# Patient Record
Sex: FEMALE | Race: WHITE | NOT HISPANIC OR LATINO
[De-identification: names, ages, dates, MRNs, and addresses within clinical notes are randomized per-mention and may not be internally consistent; named-entity substitution may affect disease eponyms.]

---

## 2017-02-03 ENCOUNTER — OTHER (OUTPATIENT)
Age: 67
End: 2017-02-03

## 2017-02-03 ENCOUNTER — RESULT CHARGE (OUTPATIENT)
Age: 67
End: 2017-02-03

## 2017-02-03 ENCOUNTER — APPOINTMENT (OUTPATIENT)
Dept: INTERNAL MEDICINE | Facility: CLINIC | Age: 67
End: 2017-02-03

## 2017-02-03 VITALS
BODY MASS INDEX: 19.46 KG/M2 | TEMPERATURE: 97 F | HEIGHT: 67 IN | WEIGHT: 124 LBS | DIASTOLIC BLOOD PRESSURE: 64 MMHG | SYSTOLIC BLOOD PRESSURE: 110 MMHG

## 2017-02-03 DIAGNOSIS — K21.9 GASTRO-ESOPHAGEAL REFLUX DISEASE W/OUT ESOPHAGITIS: ICD-10-CM

## 2017-03-30 PROBLEM — Z80.0 FAMILY HISTORY OF COLON CANCER: Status: ACTIVE | Noted: 2017-03-30

## 2017-03-30 PROBLEM — Z80.0 FAMILY HISTORY OF PANCREATIC CANCER: Status: ACTIVE | Noted: 2017-03-30

## 2017-07-26 ENCOUNTER — RX RENEWAL (OUTPATIENT)
Age: 67
End: 2017-07-26

## 2017-11-15 ENCOUNTER — APPOINTMENT (OUTPATIENT)
Dept: SURGERY | Facility: CLINIC | Age: 67
End: 2017-11-15
Payer: MEDICARE

## 2017-11-15 PROCEDURE — 99205K: CUSTOM

## 2017-11-27 ENCOUNTER — OUTPATIENT (OUTPATIENT)
Dept: OUTPATIENT SERVICES | Facility: HOSPITAL | Age: 67
LOS: 1 days | End: 2017-11-27
Payer: MEDICARE

## 2017-11-27 ENCOUNTER — APPOINTMENT (OUTPATIENT)
Dept: ULTRASOUND IMAGING | Facility: IMAGING CENTER | Age: 67
End: 2017-11-27
Payer: MEDICARE

## 2017-11-27 VITALS
SYSTOLIC BLOOD PRESSURE: 100 MMHG | WEIGHT: 121.92 LBS | TEMPERATURE: 97 F | HEIGHT: 66 IN | HEART RATE: 60 BPM | DIASTOLIC BLOOD PRESSURE: 60 MMHG | RESPIRATION RATE: 16 BRPM

## 2017-11-27 DIAGNOSIS — R92.8 OTHER ABNORMAL AND INCONCLUSIVE FINDINGS ON DIAGNOSTIC IMAGING OF BREAST: ICD-10-CM

## 2017-11-27 DIAGNOSIS — Z00.00 ENCOUNTER FOR GENERAL ADULT MEDICAL EXAMINATION WITHOUT ABNORMAL FINDINGS: ICD-10-CM

## 2017-11-27 DIAGNOSIS — Z98.890 OTHER SPECIFIED POSTPROCEDURAL STATES: Chronic | ICD-10-CM

## 2017-11-27 DIAGNOSIS — K21.9 GASTRO-ESOPHAGEAL REFLUX DISEASE WITHOUT ESOPHAGITIS: ICD-10-CM

## 2017-11-27 LAB
ALBUMIN SERPL ELPH-MCNC: 4.2 G/DL — SIGNIFICANT CHANGE UP (ref 3.3–5)
ALP SERPL-CCNC: 57 U/L — SIGNIFICANT CHANGE UP (ref 40–120)
ALT FLD-CCNC: 27 U/L — SIGNIFICANT CHANGE UP (ref 4–33)
AST SERPL-CCNC: 25 U/L — SIGNIFICANT CHANGE UP (ref 4–32)
BILIRUB SERPL-MCNC: 0.3 MG/DL — SIGNIFICANT CHANGE UP (ref 0.2–1.2)
BUN SERPL-MCNC: 17 MG/DL — SIGNIFICANT CHANGE UP (ref 7–23)
CALCIUM SERPL-MCNC: 9 MG/DL — SIGNIFICANT CHANGE UP (ref 8.4–10.5)
CHLORIDE SERPL-SCNC: 101 MMOL/L — SIGNIFICANT CHANGE UP (ref 98–107)
CO2 SERPL-SCNC: 30 MMOL/L — SIGNIFICANT CHANGE UP (ref 22–31)
CREAT SERPL-MCNC: 0.77 MG/DL — SIGNIFICANT CHANGE UP (ref 0.5–1.3)
GLUCOSE SERPL-MCNC: 81 MG/DL — SIGNIFICANT CHANGE UP (ref 70–99)
HCT VFR BLD CALC: 40.7 % — SIGNIFICANT CHANGE UP (ref 34.5–45)
HGB BLD-MCNC: 13.2 G/DL — SIGNIFICANT CHANGE UP (ref 11.5–15.5)
MCHC RBC-ENTMCNC: 28.9 PG — SIGNIFICANT CHANGE UP (ref 27–34)
MCHC RBC-ENTMCNC: 32.4 % — SIGNIFICANT CHANGE UP (ref 32–36)
MCV RBC AUTO: 89.1 FL — SIGNIFICANT CHANGE UP (ref 80–100)
NRBC # FLD: 0 — SIGNIFICANT CHANGE UP
PLATELET # BLD AUTO: 198 K/UL — SIGNIFICANT CHANGE UP (ref 150–400)
PMV BLD: 10.8 FL — SIGNIFICANT CHANGE UP (ref 7–13)
POTASSIUM SERPL-MCNC: 3.9 MMOL/L — SIGNIFICANT CHANGE UP (ref 3.5–5.3)
POTASSIUM SERPL-SCNC: 3.9 MMOL/L — SIGNIFICANT CHANGE UP (ref 3.5–5.3)
PROT SERPL-MCNC: 6.3 G/DL — SIGNIFICANT CHANGE UP (ref 6–8.3)
RBC # BLD: 4.57 M/UL — SIGNIFICANT CHANGE UP (ref 3.8–5.2)
RBC # FLD: 11.9 % — SIGNIFICANT CHANGE UP (ref 10.3–14.5)
SODIUM SERPL-SCNC: 141 MMOL/L — SIGNIFICANT CHANGE UP (ref 135–145)
WBC # BLD: 3.75 K/UL — LOW (ref 3.8–10.5)
WBC # FLD AUTO: 3.75 K/UL — LOW (ref 3.8–10.5)

## 2017-11-27 PROCEDURE — 19285 PERQ DEV BREAST 1ST US IMAG: CPT

## 2017-11-27 PROCEDURE — 19285 PERQ DEV BREAST 1ST US IMAG: CPT | Mod: LT

## 2017-11-27 PROCEDURE — 93010 ELECTROCARDIOGRAM REPORT: CPT

## 2017-11-27 PROCEDURE — C1739: CPT

## 2017-11-27 PROCEDURE — 71020: CPT | Mod: 26

## 2017-11-27 RX ORDER — ESTRADIOL 4 UG/1
1 INSERT VAGINAL
Qty: 0 | Refills: 0 | COMMUNITY

## 2017-11-27 RX ORDER — SODIUM CHLORIDE 9 MG/ML
1000 INJECTION, SOLUTION INTRAVENOUS
Qty: 0 | Refills: 0 | Status: DISCONTINUED | OUTPATIENT
Start: 2017-11-28 | End: 2017-12-13

## 2017-11-27 NOTE — H&P PST ADULT - PROBLEM SELECTOR PLAN 1
Pt is scheduled left breast biopsy with seed localization on 11/28/17.   Pre op instructions including chlorhexidine wash given and pt able to verbalize understanding.

## 2017-11-27 NOTE — H&P PST ADULT - HISTORY OF PRESENT ILLNESS
68 yo female with PMH GERD presents to pre surgical testing.  Pt reports she had a routine mammogram and sonogram October 2017, abnormality found in left breast.  Pt reports left breast biopsy done Oct 2017, revealed abnormality.  Pt is scheduled left breast biopsy with seed localization on 11/28/17.

## 2017-11-27 NOTE — H&P PST ADULT - NEGATIVE MUSCULOSKELETAL SYMPTOMS
no arthralgia/no joint swelling/no muscle weakness/no stiffness/no neck pain/no back pain/no leg pain R/no leg pain L/no myalgia/no muscle cramps/no arm pain L/no arm pain R

## 2017-11-27 NOTE — H&P PST ADULT - NSANTHOSAYNRD_GEN_A_CORE
No. GRAY screening performed.  STOP BANG Legend: 0-2 = LOW Risk; 3-4 = INTERMEDIATE Risk; 5-8 = HIGH Risk

## 2017-11-27 NOTE — H&P PST ADULT - NEGATIVE CARDIOVASCULAR SYMPTOMS
no orthopnea/no paroxysmal nocturnal dyspnea/no chest pain/no peripheral edema/no dyspnea on exertion/no palpitations/no claudication

## 2017-11-27 NOTE — H&P PST ADULT - FAMILY HISTORY
Father  Still living? No  Family history of ischemic heart disease, Age at diagnosis: Age Unknown     Mother  Still living? Yes, Estimated age: Age Unknown  Family history of colon cancer, Age at diagnosis: Age Unknown  Family history of breast cancer, Age at diagnosis: Age Unknown

## 2017-11-27 NOTE — H&P PST ADULT - LYMPHATIC
posterior cervical L/supraclavicular L/anterior cervical L/supraclavicular R/anterior cervical R/posterior cervical R

## 2017-11-27 NOTE — H&P PST ADULT - RS GEN PE MLT RESP DETAILS PC
no wheezes/good air movement/no intercostal retractions/no rhonchi/no chest wall tenderness/airway patent/no subcutaneous emphysema/clear to auscultation bilaterally/breath sounds equal/respirations non-labored/no rales

## 2017-11-27 NOTE — H&P PST ADULT - NEGATIVE NEUROLOGICAL SYMPTOMS
no paresthesias/no transient paralysis/no weakness/no generalized seizures/no loss of sensation/no difficulty walking/no vertigo

## 2017-11-27 NOTE — H&P PST ADULT - GASTROINTESTINAL COMMENTS
"had stomach virus 11/21, lasted <24hrs, no symptoms since 11/22.  Called PCP on 11/21 and PCP said I did not need to be seen" "had stomach virus 11/21, lasted <24hrs, Called PCP on 11/21 and PCP said I did not need to be seen. No symptoms since 11/22."

## 2017-11-27 NOTE — H&P PST ADULT - PMH
GERD (gastroesophageal reflux disease)    Other abnormal and inconclusive findings on diagnostic imaging of breast Dry eyes    GERD (gastroesophageal reflux disease)    Other abnormal and inconclusive findings on diagnostic imaging of breast

## 2017-11-28 ENCOUNTER — APPOINTMENT (OUTPATIENT)
Dept: SURGERY | Facility: HOSPITAL | Age: 67
End: 2017-11-28

## 2017-11-28 ENCOUNTER — RESULT REVIEW (OUTPATIENT)
Age: 67
End: 2017-11-28

## 2017-11-28 ENCOUNTER — OUTPATIENT (OUTPATIENT)
Dept: OUTPATIENT SERVICES | Facility: HOSPITAL | Age: 67
LOS: 1 days | Discharge: HOME | End: 2017-11-28
Payer: MEDICARE

## 2017-11-28 VITALS
TEMPERATURE: 99 F | WEIGHT: 121.92 LBS | HEART RATE: 72 BPM | RESPIRATION RATE: 15 BRPM | SYSTOLIC BLOOD PRESSURE: 107 MMHG | OXYGEN SATURATION: 98 % | HEIGHT: 66 IN | DIASTOLIC BLOOD PRESSURE: 51 MMHG

## 2017-11-28 VITALS
DIASTOLIC BLOOD PRESSURE: 46 MMHG | SYSTOLIC BLOOD PRESSURE: 108 MMHG | RESPIRATION RATE: 16 BRPM | OXYGEN SATURATION: 97 % | HEART RATE: 73 BPM

## 2017-11-28 DIAGNOSIS — Z98.890 OTHER SPECIFIED POSTPROCEDURAL STATES: Chronic | ICD-10-CM

## 2017-11-28 DIAGNOSIS — R92.8 OTHER ABNORMAL AND INCONCLUSIVE FINDINGS ON DIAGNOSTIC IMAGING OF BREAST: ICD-10-CM

## 2017-11-28 PROCEDURE — 88360 TUMOR IMMUNOHISTOCHEM/MANUAL: CPT | Mod: 26

## 2017-11-28 PROCEDURE — 76098 X-RAY EXAM SURGICAL SPECIMEN: CPT | Mod: 26,GC

## 2017-11-28 PROCEDURE — 19125K: CUSTOM | Mod: LT

## 2017-11-28 PROCEDURE — 88307 TISSUE EXAM BY PATHOLOGIST: CPT | Mod: 26

## 2017-11-28 NOTE — ASU PATIENT PROFILE, ADULT - PMH
Dry eyes    GERD (gastroesophageal reflux disease)    Other abnormal and inconclusive findings on diagnostic imaging of breast

## 2017-11-28 NOTE — ASU DISCHARGE PLAN (ADULT/PEDIATRIC). - MEDICATION SUMMARY - MEDICATIONS TO TAKE
I will START or STAY ON the medications listed below when I get home from the hospital:    Refresh Liquigel ophthalmic solution  -- 1 drop(s) to each affected eye once a day  -- Indication: For Home med    pantoprazole 40 mg oral delayed release tablet  -- 1 tab(s) by mouth once a day  -- Indication: For GERD    Yuvafem 10 mcg vaginal tablet  -- Mon and Thus  -- Indication: For Home med

## 2017-11-28 NOTE — ASU DISCHARGE PLAN (ADULT/PEDIATRIC). - NOTIFY
Fever greater than 101/Bleeding that does not stop/Swelling that continues Swelling that continues/Increased Irritability or Sluggishness/Pain not relieved by Medications/Fever greater than 101/Persistent Nausea and Vomiting/Bleeding that does not stop

## 2017-11-30 ENCOUNTER — APPOINTMENT (OUTPATIENT)
Dept: MRI IMAGING | Facility: IMAGING CENTER | Age: 67
End: 2017-11-30
Payer: MEDICARE

## 2017-11-30 ENCOUNTER — OUTPATIENT (OUTPATIENT)
Dept: OUTPATIENT SERVICES | Facility: HOSPITAL | Age: 67
LOS: 1 days | End: 2017-11-30
Payer: MEDICARE

## 2017-11-30 DIAGNOSIS — Z00.8 ENCOUNTER FOR OTHER GENERAL EXAMINATION: ICD-10-CM

## 2017-11-30 DIAGNOSIS — Z98.890 OTHER SPECIFIED POSTPROCEDURAL STATES: Chronic | ICD-10-CM

## 2017-11-30 LAB — SURGICAL PATHOLOGY STUDY: SIGNIFICANT CHANGE UP

## 2017-11-30 PROCEDURE — C8937: CPT

## 2017-11-30 PROCEDURE — 77059 MRI BREAST BILATERAL: CPT | Mod: 26

## 2017-11-30 PROCEDURE — 0159T: CPT | Mod: 26

## 2017-11-30 PROCEDURE — 82565 ASSAY OF CREATININE: CPT

## 2017-11-30 PROCEDURE — C8908: CPT

## 2017-11-30 PROCEDURE — A9585: CPT

## 2017-12-05 ENCOUNTER — OUTPATIENT (OUTPATIENT)
Dept: OUTPATIENT SERVICES | Facility: HOSPITAL | Age: 67
LOS: 1 days | Discharge: HOME | End: 2017-12-05
Payer: MEDICARE

## 2017-12-05 ENCOUNTER — APPOINTMENT (OUTPATIENT)
Dept: NUCLEAR MEDICINE | Facility: HOSPITAL | Age: 67
End: 2017-12-05

## 2017-12-05 ENCOUNTER — RESULT REVIEW (OUTPATIENT)
Age: 67
End: 2017-12-05

## 2017-12-05 ENCOUNTER — APPOINTMENT (OUTPATIENT)
Dept: SURGERY | Facility: HOSPITAL | Age: 67
End: 2017-12-05

## 2017-12-05 VITALS
HEIGHT: 66 IN | TEMPERATURE: 98 F | OXYGEN SATURATION: 100 % | WEIGHT: 121.92 LBS | HEART RATE: 63 BPM | RESPIRATION RATE: 16 BRPM | SYSTOLIC BLOOD PRESSURE: 102 MMHG | DIASTOLIC BLOOD PRESSURE: 50 MMHG

## 2017-12-05 VITALS
TEMPERATURE: 98 F | OXYGEN SATURATION: 99 % | HEART RATE: 59 BPM | RESPIRATION RATE: 16 BRPM | SYSTOLIC BLOOD PRESSURE: 100 MMHG | DIASTOLIC BLOOD PRESSURE: 56 MMHG

## 2017-12-05 DIAGNOSIS — Z98.890 OTHER SPECIFIED POSTPROCEDURAL STATES: Chronic | ICD-10-CM

## 2017-12-05 DIAGNOSIS — C50.912 MALIGNANT NEOPLASM OF UNSPECIFIED SITE OF LEFT FEMALE BREAST: ICD-10-CM

## 2017-12-05 PROCEDURE — 38792K: CUSTOM | Mod: 58,LT

## 2017-12-05 PROCEDURE — 38525K: CUSTOM | Mod: 58,LT

## 2017-12-05 PROCEDURE — 88305 TISSUE EXAM BY PATHOLOGIST: CPT | Mod: 26

## 2017-12-05 PROCEDURE — 19301K: CUSTOM | Mod: 58,LT

## 2017-12-05 RX ORDER — ESTRADIOL 4 UG/1
1 INSERT VAGINAL
Qty: 0 | Refills: 0 | COMMUNITY

## 2017-12-05 RX ORDER — ACETAMINOPHEN 500 MG
1 TABLET ORAL
Qty: 8 | Refills: 0 | OUTPATIENT
Start: 2017-12-05

## 2017-12-05 RX ORDER — PANTOPRAZOLE SODIUM 20 MG/1
1 TABLET, DELAYED RELEASE ORAL
Qty: 0 | Refills: 0 | COMMUNITY

## 2017-12-05 RX ORDER — SODIUM CHLORIDE 9 MG/ML
1000 INJECTION, SOLUTION INTRAVENOUS
Qty: 0 | Refills: 0 | Status: DISCONTINUED | OUTPATIENT
Start: 2017-12-05 | End: 2017-12-20

## 2017-12-05 NOTE — BRIEF OPERATIVE NOTE - PROCEDURE
<<-----Click on this checkbox to enter Procedure [place] lymph node biopsy  12/05/2017    Active  RHETT  Breast biopsy, left  12/05/2017    Active  MARIE

## 2017-12-05 NOTE — ASU DISCHARGE PLAN (ADULT/PEDIATRIC). - SPECIAL INSTRUCTIONS
Please call the office if you are having brisk bleeding through several new bandages, a warm or red breast with or without pus or a fever greater than 101.    Small amounts of blood may escape from under the steri strips. They will fall off on their own.    Take tylenol or ibuprofen as needed for pain.    Bruising is normal and may become more pronounced in the days following surgery.    Please call the office and make an appointment for about one week from the date of surgery. You may dial [phone] to make an appointment.

## 2017-12-05 NOTE — ASU DISCHARGE PLAN (ADULT/PEDIATRIC). - NOTIFY
Numbness, color, or temperature change to extremity/Fever greater than 101/Swelling that continues/Bleeding that does not stop Fever greater than 101/Numbness, color, or temperature change to extremity/Persistent Nausea and Vomiting/Unable to Urinate/Pain not relieved by Medications/Bleeding that does not stop/Swelling that continues

## 2017-12-05 NOTE — ASU DISCHARGE PLAN (ADULT/PEDIATRIC). - MEDICATION SUMMARY - MEDICATIONS TO TAKE
I will START or STAY ON the medications listed below when I get home from the hospital:    Refresh Liquigel ophthalmic solution  -- 1 drop(s) to each affected eye once a day  -- Indication: For Home med    pantoprazole 40 mg oral delayed release tablet  -- 1 tab(s) by mouth once a day  -- Indication: For Home med    Yuvafem 10 mcg vaginal tablet  -- Mon and Thus  -- Indication: For Home med

## 2017-12-07 LAB — SURGICAL PATHOLOGY STUDY: SIGNIFICANT CHANGE UP

## 2017-12-11 ENCOUNTER — APPOINTMENT (OUTPATIENT)
Dept: SURGERY | Facility: CLINIC | Age: 67
End: 2017-12-11
Payer: MEDICARE

## 2017-12-11 PROCEDURE — 99024 POSTOP FOLLOW-UP VISIT: CPT

## 2017-12-11 NOTE — PACU DISCHARGE NOTE - HYDRATION STATUS:
LOV: 5--pt to fu 6 months-please call to schedule  No future follow up scheduled    Last refill:     Disp Refills Start End    Propranolol HCl  MG CAPSULE SR 24 HR 30 capsule 5 5/11/2017     Sig: Sig 1 tab po daily      Propranolol HCl  MG CAPSULE SR 24 HR  Sig 1 tab po daily   Eprescribe, Disp-30 capsule, R-1   Patient needs to schedule appt to ensure future refills Satisfactory

## 2017-12-15 ENCOUNTER — RESULT REVIEW (OUTPATIENT)
Age: 67
End: 2017-12-15

## 2017-12-20 ENCOUNTER — OUTPATIENT (OUTPATIENT)
Dept: OUTPATIENT SERVICES | Facility: HOSPITAL | Age: 67
LOS: 1 days | Discharge: HOME | End: 2017-12-20

## 2017-12-20 DIAGNOSIS — Z98.890 OTHER SPECIFIED POSTPROCEDURAL STATES: Chronic | ICD-10-CM

## 2017-12-22 ENCOUNTER — OUTPATIENT (OUTPATIENT)
Dept: OUTPATIENT SERVICES | Facility: HOSPITAL | Age: 67
LOS: 1 days | Discharge: HOME | End: 2017-12-22

## 2017-12-22 DIAGNOSIS — Z98.890 OTHER SPECIFIED POSTPROCEDURAL STATES: Chronic | ICD-10-CM

## 2017-12-22 DIAGNOSIS — D72.819 DECREASED WHITE BLOOD CELL COUNT, UNSPECIFIED: ICD-10-CM

## 2018-01-03 ENCOUNTER — APPOINTMENT (OUTPATIENT)
Dept: HEMATOLOGY ONCOLOGY | Facility: CLINIC | Age: 68
End: 2018-01-03
Payer: MEDICARE

## 2018-01-03 ENCOUNTER — APPOINTMENT (OUTPATIENT)
Dept: RADIATION ONCOLOGY | Facility: CLINIC | Age: 68
End: 2018-01-03
Payer: MEDICARE

## 2018-01-03 VITALS
RESPIRATION RATE: 16 BRPM | DIASTOLIC BLOOD PRESSURE: 70 MMHG | HEIGHT: 66 IN | WEIGHT: 121 LBS | SYSTOLIC BLOOD PRESSURE: 105 MMHG | OXYGEN SATURATION: 100 % | HEART RATE: 81 BPM | BODY MASS INDEX: 19.44 KG/M2 | TEMPERATURE: 98.1 F

## 2018-01-03 VITALS
BODY MASS INDEX: 19.33 KG/M2 | SYSTOLIC BLOOD PRESSURE: 111 MMHG | HEIGHT: 66.73 IN | WEIGHT: 121.7 LBS | TEMPERATURE: 98.4 F | RESPIRATION RATE: 16 BRPM | DIASTOLIC BLOOD PRESSURE: 68 MMHG | HEART RATE: 82 BPM | OXYGEN SATURATION: 100 %

## 2018-01-03 DIAGNOSIS — Z86.39 PERSONAL HISTORY OF OTHER ENDOCRINE, NUTRITIONAL AND METABOLIC DISEASE: ICD-10-CM

## 2018-01-03 DIAGNOSIS — Z80.3 FAMILY HISTORY OF MALIGNANT NEOPLASM OF BREAST: ICD-10-CM

## 2018-01-03 DIAGNOSIS — R07.89 OTHER CHEST PAIN: ICD-10-CM

## 2018-01-03 DIAGNOSIS — Z80.0 FAMILY HISTORY OF MALIGNANT NEOPLASM OF DIGESTIVE ORGANS: ICD-10-CM

## 2018-01-03 DIAGNOSIS — Z87.09 PERSONAL HISTORY OF OTHER DISEASES OF THE RESPIRATORY SYSTEM: ICD-10-CM

## 2018-01-03 DIAGNOSIS — R14.0 ABDOMINAL DISTENSION (GASEOUS): ICD-10-CM

## 2018-01-03 DIAGNOSIS — Z23 ENCOUNTER FOR IMMUNIZATION: ICD-10-CM

## 2018-01-03 DIAGNOSIS — Z86.79 PERSONAL HISTORY OF OTHER DISEASES OF THE CIRCULATORY SYSTEM: ICD-10-CM

## 2018-01-03 DIAGNOSIS — K21.9 GASTRO-ESOPHAGEAL REFLUX DISEASE W/OUT ESOPHAGITIS: ICD-10-CM

## 2018-01-03 PROCEDURE — 99215 OFFICE O/P EST HI 40 MIN: CPT

## 2018-01-03 PROCEDURE — 99204 OFFICE O/P NEW MOD 45 MIN: CPT | Mod: 25,GC

## 2018-01-03 RX ORDER — ALUMINUM HYDROXIDE AND MAGNESIUM CARBONATE 254; 237.5 MG/5ML; MG/5ML
508-475 LIQUID ORAL
Qty: 1 | Refills: 5 | Status: DISCONTINUED | COMMUNITY
Start: 2017-02-03 | End: 2018-01-03

## 2018-01-08 ENCOUNTER — APPOINTMENT (OUTPATIENT)
Dept: SURGERY | Facility: CLINIC | Age: 68
End: 2018-01-08
Payer: MEDICARE

## 2018-01-08 ENCOUNTER — OTHER (OUTPATIENT)
Age: 68
End: 2018-01-08

## 2018-01-08 PROCEDURE — 99024 POSTOP FOLLOW-UP VISIT: CPT

## 2018-01-10 DIAGNOSIS — C50.919 MALIGNANT NEOPLASM OF UNSPECIFIED SITE OF UNSPECIFIED FEMALE BREAST: ICD-10-CM

## 2018-05-01 ENCOUNTER — OUTPATIENT (OUTPATIENT)
Dept: OUTPATIENT SERVICES | Facility: HOSPITAL | Age: 68
LOS: 1 days | Discharge: HOME | End: 2018-05-01

## 2018-05-01 DIAGNOSIS — Z98.890 OTHER SPECIFIED POSTPROCEDURAL STATES: Chronic | ICD-10-CM

## 2018-05-01 DIAGNOSIS — D72.819 DECREASED WHITE BLOOD CELL COUNT, UNSPECIFIED: ICD-10-CM

## 2018-05-01 DIAGNOSIS — C50.919 MALIGNANT NEOPLASM OF UNSPECIFIED SITE OF UNSPECIFIED FEMALE BREAST: ICD-10-CM

## 2018-05-07 ENCOUNTER — RESULT REVIEW (OUTPATIENT)
Age: 68
End: 2018-05-07

## 2018-05-07 ENCOUNTER — APPOINTMENT (OUTPATIENT)
Dept: HEMATOLOGY ONCOLOGY | Facility: CLINIC | Age: 68
End: 2018-05-07
Payer: MEDICARE

## 2018-05-07 VITALS
TEMPERATURE: 98.3 F | WEIGHT: 120.81 LBS | SYSTOLIC BLOOD PRESSURE: 101 MMHG | OXYGEN SATURATION: 100 % | BODY MASS INDEX: 19.5 KG/M2 | RESPIRATION RATE: 16 BRPM | DIASTOLIC BLOOD PRESSURE: 52 MMHG | HEART RATE: 72 BPM

## 2018-05-07 LAB
BASOPHILS # BLD AUTO: 0 K/UL — SIGNIFICANT CHANGE UP (ref 0–0.2)
BASOPHILS NFR BLD AUTO: 0.6 % — SIGNIFICANT CHANGE UP (ref 0–2)
EOSINOPHIL # BLD AUTO: 0.1 K/UL — SIGNIFICANT CHANGE UP (ref 0–0.5)
EOSINOPHIL NFR BLD AUTO: 1 % — SIGNIFICANT CHANGE UP (ref 0–6)
HCT VFR BLD CALC: 41.9 % — SIGNIFICANT CHANGE UP (ref 34.5–45)
HGB BLD-MCNC: 14.6 G/DL — SIGNIFICANT CHANGE UP (ref 11.5–15.5)
LYMPHOCYTES # BLD AUTO: 1 K/UL — SIGNIFICANT CHANGE UP (ref 1–3.3)
LYMPHOCYTES # BLD AUTO: 13 % — SIGNIFICANT CHANGE UP (ref 13–44)
MCHC RBC-ENTMCNC: 32 PG — SIGNIFICANT CHANGE UP (ref 27–34)
MCHC RBC-ENTMCNC: 34.9 G/DL — SIGNIFICANT CHANGE UP (ref 32–36)
MCV RBC AUTO: 91.6 FL — SIGNIFICANT CHANGE UP (ref 80–100)
MONOCYTES # BLD AUTO: 0.4 K/UL — SIGNIFICANT CHANGE UP (ref 0–0.9)
MONOCYTES NFR BLD AUTO: 5.8 % — SIGNIFICANT CHANGE UP (ref 2–14)
NEUTROPHILS # BLD AUTO: 5.9 K/UL — SIGNIFICANT CHANGE UP (ref 1.8–7.4)
NEUTROPHILS NFR BLD AUTO: 79.6 % — HIGH (ref 43–77)
PLATELET # BLD AUTO: 175 K/UL — SIGNIFICANT CHANGE UP (ref 150–400)
RBC # BLD: 4.58 M/UL — SIGNIFICANT CHANGE UP (ref 3.8–5.2)
RBC # FLD: 11.6 % — SIGNIFICANT CHANGE UP (ref 10.3–14.5)
WBC # BLD: 7.4 K/UL — SIGNIFICANT CHANGE UP (ref 3.8–10.5)
WBC # FLD AUTO: 7.4 K/UL — SIGNIFICANT CHANGE UP (ref 3.8–10.5)

## 2018-05-07 PROCEDURE — 99215 OFFICE O/P EST HI 40 MIN: CPT

## 2018-05-17 LAB
25(OH)D3 SERPL-MCNC: 44.6 NG/ML
ALBUMIN SERPL ELPH-MCNC: 4.6 G/DL
ALP BLD-CCNC: 63 U/L
ALT SERPL-CCNC: 41 U/L
ANION GAP SERPL CALC-SCNC: 12 MMOL/L
AST SERPL-CCNC: 45 U/L
BILIRUB SERPL-MCNC: 0.3 MG/DL
BUN SERPL-MCNC: 18 MG/DL
CALCIUM SERPL-MCNC: 9.2 MG/DL
CHLORIDE SERPL-SCNC: 103 MMOL/L
CO2 SERPL-SCNC: 29 MMOL/L
CREAT SERPL-MCNC: 0.82 MG/DL
GLUCOSE SERPL-MCNC: 90 MG/DL
POTASSIUM SERPL-SCNC: 4.2 MMOL/L
PROT SERPL-MCNC: 6.3 G/DL
SODIUM SERPL-SCNC: 144 MMOL/L

## 2018-06-04 ENCOUNTER — APPOINTMENT (OUTPATIENT)
Dept: SURGERY | Facility: CLINIC | Age: 68
End: 2018-06-04
Payer: MEDICARE

## 2018-06-04 PROCEDURE — 99213K: CUSTOM

## 2018-07-22 PROBLEM — Z80.0 FAMILY HISTORY OF COLON CANCER: Status: ACTIVE | Noted: 2018-01-03

## 2018-09-07 PROBLEM — H04.123 DRY EYE SYNDROME OF BILATERAL LACRIMAL GLANDS: Chronic | Status: ACTIVE | Noted: 2017-11-27

## 2018-09-07 PROBLEM — K21.9 GASTRO-ESOPHAGEAL REFLUX DISEASE WITHOUT ESOPHAGITIS: Chronic | Status: ACTIVE | Noted: 2017-11-27

## 2018-09-07 PROBLEM — R92.8 OTHER ABNORMAL AND INCONCLUSIVE FINDINGS ON DIAGNOSTIC IMAGING OF BREAST: Chronic | Status: ACTIVE | Noted: 2017-11-27

## 2018-09-11 ENCOUNTER — FORM ENCOUNTER (OUTPATIENT)
Age: 68
End: 2018-09-11

## 2018-09-12 ENCOUNTER — APPOINTMENT (OUTPATIENT)
Dept: RADIOLOGY | Facility: IMAGING CENTER | Age: 68
End: 2018-09-12
Payer: MEDICARE

## 2018-09-12 ENCOUNTER — OUTPATIENT (OUTPATIENT)
Dept: OUTPATIENT SERVICES | Facility: HOSPITAL | Age: 68
LOS: 1 days | End: 2018-09-12
Payer: MEDICARE

## 2018-09-12 DIAGNOSIS — C50.919 MALIGNANT NEOPLASM OF UNSPECIFIED SITE OF UNSPECIFIED FEMALE BREAST: ICD-10-CM

## 2018-09-12 DIAGNOSIS — Z98.890 OTHER SPECIFIED POSTPROCEDURAL STATES: Chronic | ICD-10-CM

## 2018-09-12 PROCEDURE — 77080 DXA BONE DENSITY AXIAL: CPT

## 2018-09-12 PROCEDURE — 77080 DXA BONE DENSITY AXIAL: CPT | Mod: 26

## 2018-10-26 ENCOUNTER — OUTPATIENT (OUTPATIENT)
Dept: OUTPATIENT SERVICES | Facility: HOSPITAL | Age: 68
LOS: 1 days | Discharge: HOME | End: 2018-10-26

## 2018-10-26 DIAGNOSIS — Z98.890 OTHER SPECIFIED POSTPROCEDURAL STATES: Chronic | ICD-10-CM

## 2018-10-26 DIAGNOSIS — C50.919 MALIGNANT NEOPLASM OF UNSPECIFIED SITE OF UNSPECIFIED FEMALE BREAST: ICD-10-CM

## 2018-10-26 DIAGNOSIS — D72.819 DECREASED WHITE BLOOD CELL COUNT, UNSPECIFIED: ICD-10-CM

## 2018-11-05 ENCOUNTER — APPOINTMENT (OUTPATIENT)
Dept: HEMATOLOGY ONCOLOGY | Facility: CLINIC | Age: 68
End: 2018-11-05

## 2018-11-20 NOTE — OB HISTORY
[Currently In Menopause] : currently in menopause [Post-Menopause, No Sxs] : post-menopausal, currently without symptoms [Menopause Age: ____] : age at menopause was [unfilled] [___] : Living: [unfilled]

## 2018-11-21 ENCOUNTER — APPOINTMENT (OUTPATIENT)
Dept: HEMATOLOGY ONCOLOGY | Facility: CLINIC | Age: 68
End: 2018-11-21
Payer: MEDICARE

## 2018-11-21 VITALS
WEIGHT: 123.9 LBS | HEART RATE: 65 BPM | DIASTOLIC BLOOD PRESSURE: 66 MMHG | SYSTOLIC BLOOD PRESSURE: 113 MMHG | OXYGEN SATURATION: 99 % | BODY MASS INDEX: 20 KG/M2 | RESPIRATION RATE: 17 BRPM | TEMPERATURE: 97.8 F

## 2018-11-21 PROCEDURE — 99214 OFFICE O/P EST MOD 30 MIN: CPT

## 2018-11-21 NOTE — REASON FOR VISIT
[Follow-Up Visit] : a follow-up [Other: _____] : [unfilled] [FreeTextEntry2] : Breast cancer Left ER/LA positive HER-2 negative

## 2018-11-21 NOTE — HISTORY OF PRESENT ILLNESS
[de-identified] : Ms. ALLYN TUTTLE is a 67 year old female here for an evaluation of breast cancer. Her oncologic history is as follows:\par \par  Patient underwent a bilateral screening mammogram and ultrasound on 10/18/17, showing an area of suspicious architectural distortion in the left 2-3 o’clock position. She underwent a directed mammography and ultrasound of the left breast. It showed, suspicious mass 2 o’clock axis left breast, 4 cm from the nipple. 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 \par \par On 11/28/17, patient underwent left breast excisional biopsy of atypical ductal hyperplasia. Pathology showed ER 95% +/ [place] 85%+/ Her 2 negative, 6mm, well-differentiated invasive ductal carcinoma of the left breast. DCIS with low to intermediate nuclear grade was present, measuring 1-2mm. There was a positive margin for invasive cancer and close inked margin for DCIS. Lymph nodes were not assessed. There was no LVI present. Oncotype DX score of 9. \par \par She underwent a MRI of the breasts on 11/30/17. It showed left breast post surgical changes and no other suspicious findings within either breast. On 12/5/17, She underwent a re-excision and sentinel lymph node biopsy. There was no residual cancer identified and one negative sentinel lymph node. Currently on the IDEA trial with Dr. HYMAN [de-identified] : Ms. ALLYN TUTTLE  is here for a follow up appt for left breast cancer on endocrine therapy since 1/2018\par She noticed some increased hot flashes in the first month of taking the medication but that has now resolved.\par She has chronic wrist joint arthritis that has not worsened with starting arimidex, doesn’t need pain meds. \par Takes arimidex daily, good compliance\par No new aches/pain,  vag dryness , excessive fatigue\par mammogram  with Dr. 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

## 2018-11-21 NOTE — ASSESSMENT
[Curative] : Goals of care discussed with patient: Curative [FreeTextEntry1] : In summary, this is a 68-year-old postmenopausal  lady with stage IA (T1b, N0, M0) ER positive, [place] positive, HER-2/sohan negative invasive well-differentiated ductal carcinoma of the left breast. She is status post lumpectomy and sentinel lymph node excision. Oncotype DX recurrence score is 9. Patient has a good performance status and is generally very healthy. On IDEA, no RT\par - Breast ca: ORIN. She is tolerating anastrozole very well without significant side effects.  Reports good compliance. Plan to continue AI for  5-10 years. She is up to date with breast imaging. \par - Osteopenia. Continue calcium and vitamin D. DEXA in 2 yrs\par - cholesterol f/b PMD- well controlled. Lifestyle modifications reviewed- healthy eating and exercise\par - Mild hot flashes: 2/2 anastrozole. Advised to wear layers and use fan prn. Consider Effexor if get worse.\par - AI induced arthralgia: Mild and tolerable. Rec stretching exercises, physical therapy and ortho f/u prn\par - GEt Copy of BW from Dr Kiki June\par \par RTC 6 m

## 2018-11-21 NOTE — PHYSICAL EXAM
[Fully active, able to carry on all pre-disease performance without restriction] : Status 0 - Fully active, able to carry on all pre-disease performance without restriction [Normal] : affect appropriate [de-identified] : healed lumpectomy and axillary scar

## 2018-11-21 NOTE — CONSULT LETTER
[Dear  ___] : Dear  [unfilled], [Consult Letter:] : I had the pleasure of evaluating your patient, [unfilled]. [Please see my note below.] : Please see my note below. [Consult Closing:] : Thank you very much for allowing me to participate in the care of this patient.  If you have any questions, please do not hesitate to contact me. [Sincerely,] : Sincerely, [DrAmmy  ___] : Dr. SCHAEFFER [FreeTextEntry3] : Kim Tapia M.D.\par  of Medicine\par [facility]\par [facility] [place]\par [address] [place] [address]\par [address]\par Tele # [phone]; Fax [phone]\par

## 2019-04-02 ENCOUNTER — FORM ENCOUNTER (OUTPATIENT)
Age: 69
End: 2019-04-02

## 2019-04-03 ENCOUNTER — APPOINTMENT (OUTPATIENT)
Dept: MRI IMAGING | Facility: IMAGING CENTER | Age: 69
End: 2019-04-03
Payer: MEDICARE

## 2019-04-03 ENCOUNTER — OUTPATIENT (OUTPATIENT)
Dept: OUTPATIENT SERVICES | Facility: HOSPITAL | Age: 69
LOS: 1 days | End: 2019-04-03
Payer: MEDICARE

## 2019-04-03 DIAGNOSIS — Z98.890 OTHER SPECIFIED POSTPROCEDURAL STATES: Chronic | ICD-10-CM

## 2019-04-03 DIAGNOSIS — Z00.8 ENCOUNTER FOR OTHER GENERAL EXAMINATION: ICD-10-CM

## 2019-04-03 PROCEDURE — A9585: CPT

## 2019-04-03 PROCEDURE — C8937: CPT

## 2019-04-03 PROCEDURE — 77049 MRI BREAST C-+ W/CAD BI: CPT | Mod: 26

## 2019-04-03 PROCEDURE — C8908: CPT

## 2019-05-16 ENCOUNTER — OUTPATIENT (OUTPATIENT)
Dept: OUTPATIENT SERVICES | Facility: HOSPITAL | Age: 69
LOS: 1 days | Discharge: HOME | End: 2019-05-16

## 2019-05-16 DIAGNOSIS — C50.919 MALIGNANT NEOPLASM OF UNSPECIFIED SITE OF UNSPECIFIED FEMALE BREAST: ICD-10-CM

## 2019-05-16 DIAGNOSIS — D72.819 DECREASED WHITE BLOOD CELL COUNT, UNSPECIFIED: ICD-10-CM

## 2019-05-16 DIAGNOSIS — Z98.890 OTHER SPECIFIED POSTPROCEDURAL STATES: Chronic | ICD-10-CM

## 2019-05-20 ENCOUNTER — APPOINTMENT (OUTPATIENT)
Dept: HEMATOLOGY ONCOLOGY | Facility: CLINIC | Age: 69
End: 2019-05-20
Payer: MEDICARE

## 2019-05-20 ENCOUNTER — TRANSCRIPTION ENCOUNTER (OUTPATIENT)
Age: 69
End: 2019-05-20

## 2019-05-20 ENCOUNTER — RESULT REVIEW (OUTPATIENT)
Age: 69
End: 2019-05-20

## 2019-05-20 VITALS
SYSTOLIC BLOOD PRESSURE: 114 MMHG | OXYGEN SATURATION: 99 % | RESPIRATION RATE: 15 BRPM | DIASTOLIC BLOOD PRESSURE: 70 MMHG | WEIGHT: 122.33 LBS | HEART RATE: 73 BPM | TEMPERATURE: 99.1 F | BODY MASS INDEX: 19.75 KG/M2

## 2019-05-20 LAB
BASOPHILS # BLD AUTO: 0 K/UL — SIGNIFICANT CHANGE UP (ref 0–0.2)
BASOPHILS NFR BLD AUTO: 1.1 % — SIGNIFICANT CHANGE UP (ref 0–2)
EOSINOPHIL # BLD AUTO: 0 K/UL — SIGNIFICANT CHANGE UP (ref 0–0.5)
EOSINOPHIL NFR BLD AUTO: 1 % — SIGNIFICANT CHANGE UP (ref 0–6)
HCT VFR BLD CALC: 39.9 % — SIGNIFICANT CHANGE UP (ref 34.5–45)
HGB BLD-MCNC: 14.4 G/DL — SIGNIFICANT CHANGE UP (ref 11.5–15.5)
LYMPHOCYTES # BLD AUTO: 1.6 K/UL — SIGNIFICANT CHANGE UP (ref 1–3.3)
LYMPHOCYTES # BLD AUTO: 34.3 % — SIGNIFICANT CHANGE UP (ref 13–44)
MCHC RBC-ENTMCNC: 32.7 PG — SIGNIFICANT CHANGE UP (ref 27–34)
MCHC RBC-ENTMCNC: 36.1 G/DL — HIGH (ref 32–36)
MCV RBC AUTO: 90.6 FL — SIGNIFICANT CHANGE UP (ref 80–100)
MONOCYTES # BLD AUTO: 0.3 K/UL — SIGNIFICANT CHANGE UP (ref 0–0.9)
MONOCYTES NFR BLD AUTO: 6.9 % — SIGNIFICANT CHANGE UP (ref 2–14)
NEUTROPHILS # BLD AUTO: 2.6 K/UL — SIGNIFICANT CHANGE UP (ref 1.8–7.4)
NEUTROPHILS NFR BLD AUTO: 56.7 % — SIGNIFICANT CHANGE UP (ref 43–77)
PLATELET # BLD AUTO: 183 K/UL — SIGNIFICANT CHANGE UP (ref 150–400)
RBC # BLD: 4.4 M/UL — SIGNIFICANT CHANGE UP (ref 3.8–5.2)
RBC # FLD: 11.6 % — SIGNIFICANT CHANGE UP (ref 10.3–14.5)
WBC # BLD: 4.6 K/UL — SIGNIFICANT CHANGE UP (ref 3.8–10.5)
WBC # FLD AUTO: 4.6 K/UL — SIGNIFICANT CHANGE UP (ref 3.8–10.5)

## 2019-05-20 PROCEDURE — 99214 OFFICE O/P EST MOD 30 MIN: CPT

## 2019-05-23 NOTE — ASSESSMENT
[Curative] : Goals of care discussed with patient: Curative [FreeTextEntry1] : In summary, this is a 68-year-old postmenopausal  lady with stage IA (T1b, N0, M0) ER positive, AR positive, HER-2/sohan negative invasive well-differentiated ductal carcinoma of the left breast. She is status post lumpectomy and sentinel lymph node excision. Oncotype DX recurrence score is 9. Patient has a good performance status and is generally very healthy. On IDEA, no RT\par - Breast ca: ORIN. She is tolerating anastrozole very well without significant side effects.  Reports good compliance. Plan to continue AI for  5-10 years. She is up to date with breast imaging. \par - Osteopenia: concern for worsening bone density due to anastrozole. Rec to  continue calcium and vit D. DEXA 1-2 yrs. \par - High cholesterol: concern for worsening cholesterol due to anastrozole.  Lipid profile annually.\par - Mild hot flashes: 2/2 anastrozole. Advised to wear layers and use fan prn. Consider Effexor if get worse.\par - AI induced arthralgia: Mild and tolerable. Rec stretching exercises, physical therapy and ortho f/u prn\par - Hair thinning- Likely 2/2 AI. Check TSH. Rec to start Biotin\par \par RTC 6 m

## 2019-05-23 NOTE — CONSULT LETTER
[Dear  ___] : Dear  [unfilled], [Consult Letter:] : I had the pleasure of evaluating your patient, [unfilled]. [Please see my note below.] : Please see my note below. [Consult Closing:] : Thank you very much for allowing me to participate in the care of this patient.  If you have any questions, please do not hesitate to contact me. [Sincerely,] : Sincerely, [DrAmmy  ___] : Dr. SCHAEFFER [FreeTextEntry3] : Kim Tapia M.D.\par  of Medicine\par [facility]\par [facility] [place]\par [address] [place] [address]\par [address]\par Tele # [phone]; Fax [phone]\par

## 2019-05-23 NOTE — REASON FOR VISIT
[Follow-Up Visit] : a follow-up [Other: _____] : [unfilled] [FreeTextEntry2] : Breast cancer Left ER/[place] positive HER-2 negative

## 2019-05-23 NOTE — PHYSICAL EXAM
[Fully active, able to carry on all pre-disease performance without restriction] : Status 0 - Fully active, able to carry on all pre-disease performance without restriction [Normal] : affect appropriate [de-identified] : healed lumpectomy and axillary scar.

## 2019-05-23 NOTE — HISTORY OF PRESENT ILLNESS
[de-identified] : Ms. ALLYN TUTTLE is a 68 year old female here for an evaluation of breast cancer. Her oncologic history is as follows:\par \par  Patient underwent a bilateral screening mammogram and ultrasound on 10/18/17, showing an area of suspicious architectural distortion in the left 2-3 o’clock position. She underwent a directed mammography and ultrasound of the left breast. It showed, suspicious mass 2 o’clock axis left breast, 4 cm from the nipple. 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 \par \par On 11/28/17, patient underwent left breast excisional biopsy of atypical ductal hyperplasia. Pathology showed ER 95% +/ [place] 85%+/ Her 2 negative, 6mm, well-differentiated invasive ductal carcinoma of the left breast. DCIS with low to intermediate nuclear grade was present, measuring 1-2mm. There was a positive margin for invasive cancer and close inked margin for DCIS. Lymph nodes were not assessed. There was no LVI present. Oncotype DX score of 9. \par \par She underwent a MRI of the breasts on 11/30/17. It showed left breast post surgical changes and no other suspicious findings within either breast. On 12/5/17, She underwent a re-excision and sentinel lymph node biopsy. There was no residual cancer identified and one negative sentinel lymph node. Currently on the IDEA trial with Dr. HYMAN [de-identified] : Ms. ALLYN TUTTLE  is here for a follow up appt for left breast cancer on endocrine therapy since 1/2018\par Takes arimidex daily, good compliance. She has chronic wrist joint arthritis that has not worsened with starting arimidex, doesn’t need pain meds. She noted hair thinning, more on the top. No new aches/pain, hot flashes, vag dryness , excessive fatigue, Gi s/e. She is active, no change in energy, wt or appetite. 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 Cholesterol f/b PMD- well controlled \par mammogram  with Dr. Marquez, NRAD 10/2018 normal\par DEXA- 9/2018 -1.5 Osteopenia,  takes ca+vit D\par \par

## 2019-05-24 LAB
25(OH)D3 SERPL-MCNC: 38.9 NG/ML
ALBUMIN SERPL ELPH-MCNC: 4.5 G/DL
ALP BLD-CCNC: 63 U/L
ALT SERPL-CCNC: 22 U/L
ANION GAP SERPL CALC-SCNC: 9 MMOL/L
AST SERPL-CCNC: 21 U/L
BILIRUB SERPL-MCNC: 0.4 MG/DL
BUN SERPL-MCNC: 20 MG/DL
CALCIUM SERPL-MCNC: 9.9 MG/DL
CHLORIDE SERPL-SCNC: 100 MMOL/L
CO2 SERPL-SCNC: 30 MMOL/L
CREAT SERPL-MCNC: 0.71 MG/DL
GLUCOSE SERPL-MCNC: 80 MG/DL
POTASSIUM SERPL-SCNC: 4 MMOL/L
PROT SERPL-MCNC: 6.3 G/DL
SODIUM SERPL-SCNC: 139 MMOL/L
TSH SERPL-ACNC: 1.96 UIU/ML

## 2019-07-03 ENCOUNTER — APPOINTMENT (OUTPATIENT)
Dept: SURGERY | Facility: CLINIC | Age: 69
End: 2019-07-03
Payer: MEDICARE

## 2019-07-03 PROCEDURE — 99213K: CUSTOM

## 2019-11-06 ENCOUNTER — OUTPATIENT (OUTPATIENT)
Dept: OUTPATIENT SERVICES | Facility: HOSPITAL | Age: 69
LOS: 1 days | Discharge: HOME | End: 2019-11-06

## 2019-11-06 DIAGNOSIS — Z98.890 OTHER SPECIFIED POSTPROCEDURAL STATES: Chronic | ICD-10-CM

## 2019-11-06 DIAGNOSIS — C50.919 MALIGNANT NEOPLASM OF UNSPECIFIED SITE OF UNSPECIFIED FEMALE BREAST: ICD-10-CM

## 2019-11-06 DIAGNOSIS — D72.819 DECREASED WHITE BLOOD CELL COUNT, UNSPECIFIED: ICD-10-CM

## 2019-11-18 ENCOUNTER — APPOINTMENT (OUTPATIENT)
Dept: HEMATOLOGY ONCOLOGY | Facility: CLINIC | Age: 69
End: 2019-11-18
Payer: MEDICARE

## 2019-11-18 ENCOUNTER — RESULT REVIEW (OUTPATIENT)
Age: 69
End: 2019-11-18

## 2019-11-18 VITALS
BODY MASS INDEX: 19.86 KG/M2 | TEMPERATURE: 97.4 F | HEART RATE: 66 BPM | DIASTOLIC BLOOD PRESSURE: 69 MMHG | RESPIRATION RATE: 16 BRPM | OXYGEN SATURATION: 97 % | WEIGHT: 123.02 LBS | SYSTOLIC BLOOD PRESSURE: 108 MMHG

## 2019-11-18 LAB
BASOPHILS # BLD AUTO: 0 K/UL — SIGNIFICANT CHANGE UP (ref 0–0.2)
BASOPHILS NFR BLD AUTO: 1.3 % — SIGNIFICANT CHANGE UP (ref 0–2)
EOSINOPHIL # BLD AUTO: 0.1 K/UL — SIGNIFICANT CHANGE UP (ref 0–0.5)
EOSINOPHIL NFR BLD AUTO: 1.7 % — SIGNIFICANT CHANGE UP (ref 0–6)
HCT VFR BLD CALC: 40.4 % — SIGNIFICANT CHANGE UP (ref 34.5–45)
HGB BLD-MCNC: 13.6 G/DL — SIGNIFICANT CHANGE UP (ref 11.5–15.5)
LYMPHOCYTES # BLD AUTO: 1.4 K/UL — SIGNIFICANT CHANGE UP (ref 1–3.3)
LYMPHOCYTES # BLD AUTO: 35.7 % — SIGNIFICANT CHANGE UP (ref 13–44)
MCHC RBC-ENTMCNC: 30.7 PG — SIGNIFICANT CHANGE UP (ref 27–34)
MCHC RBC-ENTMCNC: 33.6 G/DL — SIGNIFICANT CHANGE UP (ref 32–36)
MCV RBC AUTO: 91.4 FL — SIGNIFICANT CHANGE UP (ref 80–100)
MONOCYTES # BLD AUTO: 0.4 K/UL — SIGNIFICANT CHANGE UP (ref 0–0.9)
MONOCYTES NFR BLD AUTO: 10 % — SIGNIFICANT CHANGE UP (ref 2–14)
NEUTROPHILS # BLD AUTO: 2 K/UL — SIGNIFICANT CHANGE UP (ref 1.8–7.4)
NEUTROPHILS NFR BLD AUTO: 51.4 % — SIGNIFICANT CHANGE UP (ref 43–77)
PLATELET # BLD AUTO: 190 K/UL — SIGNIFICANT CHANGE UP (ref 150–400)
RBC # BLD: 4.42 M/UL — SIGNIFICANT CHANGE UP (ref 3.8–5.2)
RBC # FLD: 11.2 % — SIGNIFICANT CHANGE UP (ref 10.3–14.5)
WBC # BLD: 3.9 K/UL — SIGNIFICANT CHANGE UP (ref 3.8–10.5)
WBC # FLD AUTO: 3.9 K/UL — SIGNIFICANT CHANGE UP (ref 3.8–10.5)

## 2019-11-18 PROCEDURE — 99214 OFFICE O/P EST MOD 30 MIN: CPT

## 2019-11-18 RX ORDER — FAMOTIDINE 20 MG/1
20 TABLET, FILM COATED ORAL
Qty: 30 | Refills: 3 | Status: DISCONTINUED | COMMUNITY
Start: 2017-02-03 | End: 2019-11-18

## 2019-11-18 NOTE — CONSULT LETTER
[Consult Letter:] : I had the pleasure of evaluating your patient, [unfilled]. [Dear  ___] : Dear  [unfilled], [Please see my note below.] : Please see my note below. [Consult Closing:] : Thank you very much for allowing me to participate in the care of this patient.  If you have any questions, please do not hesitate to contact me. [Sincerely,] : Sincerely, [DrAmmy  ___] : Dr. SCHAEFFER [FreeTextEntry3] : Kim Tapia M.D.\par  of Medicine\par [facility]\par [facility] [place]\par [address] [place] [address]\par [address]\par Tele # [phone]; Fax [phone]\par

## 2019-11-18 NOTE — HISTORY OF PRESENT ILLNESS
[de-identified] : Ms. ALLYN TUTTLE is a 68 year old female here for an evaluation of breast cancer. Her oncologic history is as follows:\par \par  Patient underwent a bilateral screening mammogram and ultrasound on 10/18/17, showing an area of suspicious architectural distortion in the left 2-3 o’clock position. She underwent a directed mammography and ultrasound of the left breast. It showed, suspicious mass 2 o’clock axis left breast, 4 cm from the nipple. 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 \par \par On 11/28/17, patient underwent left breast excisional biopsy of atypical ductal hyperplasia. Pathology showed ER 95% +/ [place] 85%+/ Her 2 negative, 6mm, well-differentiated invasive ductal carcinoma of the left breast. DCIS with low to intermediate nuclear grade was present, measuring 1-2mm. There was a positive margin for invasive cancer and close inked margin for DCIS. Lymph nodes were not assessed. There was no LVI present. Oncotype DX score of 9. \par \par She underwent a MRI of the breasts on 11/30/17. It showed left breast post surgical changes and no other suspicious findings within either breast. On 12/5/17, She underwent a re-excision and sentinel lymph node biopsy. There was no residual cancer identified and one negative sentinel lymph node. Currently on the IDEA trial with Dr. HYMAN [de-identified] : Ms. ALLYN TUTTLE  is here for a follow up appt for left breast cancer on endocrine therapy since 1/2018.\par Takes arimidex daily, good compliance. She has chronic wrist joint arthritis that has not worsened with starting arimidex, doesn’t need pain meds. She noted hair thinning, s/p PRP, seeing derm and getting viviscal pro. No new aches/pain, hot flashes, vag dryness , excessive fatigue, Gi s/e. She is active, no change in energy, wt or appetite. 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 Cholesterol f/b PMD- well controlled \par mammogram  with Dr. Marquez, NRAD 10/2019 (NRAD)\par DEXA- 9/2018 -1.5 Osteopenia,  takes ca+vit D\par \par

## 2019-11-18 NOTE — PHYSICAL EXAM
[Fully active, able to carry on all pre-disease performance without restriction] : Status 0 - Fully active, able to carry on all pre-disease performance without restriction [Normal] : affect appropriate [de-identified] : healed lumpectomy and axillary scar.

## 2019-11-18 NOTE — REASON FOR VISIT
[Follow-Up Visit] : a follow-up [Other: _____] : [unfilled] [FreeTextEntry2] : Breast cancer Left ER/IA positive HER-2 negative

## 2019-11-18 NOTE — ASSESSMENT
[Curative] : Goals of care discussed with patient: Curative [FreeTextEntry1] : In summary, this is a 69-year-old postmenopausal  lady with stage IA (T1b, N0, M0) ER positive, [place] positive, HER-2/sohan negative invasive well-differentiated ductal carcinoma of the left breast. She is status post lumpectomy and sentinel lymph node excision. Oncotype DX recurrence score is 9. Patient has a good performance status and is generally very healthy. On IDEA, no RT\par - Breast ca: ORIN. She is tolerating anastrozole very well without significant side effects.  Reports good compliance. Plan to continue AI for  5-10 years. She is up to date with breast imaging. \par - Osteopenia: concern for worsening bone density due to anastrozole. Rec to  continue calcium and vit D. DEXA 1-2 yrs. \par - High cholesterol: concern for worsening cholesterol due to anastrozole.  Lipid profile annually.\par - AI induced arthralgia: Mild and tolerable. Rec stretching exercises, physical therapy and ortho f/u prn\par - Hair thinning- Likely 2/2 AI. Better with PRP and supplements. Continue derm f/u\par \par RTC 6 m

## 2019-12-03 LAB
25(OH)D3 SERPL-MCNC: 51.3 NG/ML
ALBUMIN SERPL ELPH-MCNC: 4.3 G/DL
ALP BLD-CCNC: 76 U/L
ALT SERPL-CCNC: 21 U/L
ANION GAP SERPL CALC-SCNC: 12 MMOL/L
AST SERPL-CCNC: 19 U/L
BILIRUB SERPL-MCNC: 0.3 MG/DL
BUN SERPL-MCNC: 14 MG/DL
CALCIUM SERPL-MCNC: 9.5 MG/DL
CHLORIDE SERPL-SCNC: 102 MMOL/L
CO2 SERPL-SCNC: 27 MMOL/L
CREAT SERPL-MCNC: 0.76 MG/DL
GLUCOSE SERPL-MCNC: 84 MG/DL
POTASSIUM SERPL-SCNC: 4 MMOL/L
PROT SERPL-MCNC: 5.9 G/DL
SODIUM SERPL-SCNC: 141 MMOL/L

## 2020-05-13 ENCOUNTER — OUTPATIENT (OUTPATIENT)
Dept: OUTPATIENT SERVICES | Facility: HOSPITAL | Age: 70
LOS: 1 days | Discharge: HOME | End: 2020-05-13

## 2020-05-13 DIAGNOSIS — Z98.890 OTHER SPECIFIED POSTPROCEDURAL STATES: Chronic | ICD-10-CM

## 2020-05-13 DIAGNOSIS — C50.919 MALIGNANT NEOPLASM OF UNSPECIFIED SITE OF UNSPECIFIED FEMALE BREAST: ICD-10-CM

## 2020-05-18 ENCOUNTER — APPOINTMENT (OUTPATIENT)
Dept: HEMATOLOGY ONCOLOGY | Facility: CLINIC | Age: 70
End: 2020-05-18
Payer: MEDICARE

## 2020-05-18 ENCOUNTER — APPOINTMENT (OUTPATIENT)
Dept: HEMATOLOGY ONCOLOGY | Facility: CLINIC | Age: 70
End: 2020-05-18

## 2020-05-18 PROCEDURE — 99214 OFFICE O/P EST MOD 30 MIN: CPT | Mod: 95

## 2020-05-18 NOTE — OB HISTORY
[Post-Menopause, No Sxs] : post-menopausal, currently without symptoms [Currently In Menopause] : currently in menopause [Menopause Age: ____] : age at menopause was [unfilled] [___] : Living: [unfilled]

## 2020-05-18 NOTE — ASSESSMENT
[Curative] : Goals of care discussed with patient: Curative [FreeTextEntry1] : In summary, this is a 69-year-old postmenopausal  lady with stage IA (T1b, N0, M0) ER positive, [place] positive, HER-2/sohan negative invasive well-differentiated ductal carcinoma of the left breast. She is status post lumpectomy and sentinel lymph node excision. Oncotype DX recurrence score is 9. Patient has a good performance status and is generally very healthy. On IDEA, no RT\par - Breast ca: ORIN. She is tolerating anastrozole very well without significant side effects.  Reports good compliance. Plan to continue AI for  5-10 years. She is up to date with breast imaging. \par - Osteopenia: concern for worsening bone density due to anastrozole. Rec to  continue calcium and vit D. DEXA 1-2 yrs. \par - High cholesterol: concern for worsening cholesterol due to anastrozole.  Lipid profile annually.\par - AI induced arthralgia: Mild and tolerable. Rec stretching exercises, physical therapy and ortho f/u prn\par - Hair thinning- Likely 2/2 AI. Better with PRP and supplements. Continue derm f/u\par \par RTC 6 m\par She is coming for breast imaging in june. Will get BW here same day. Lab only appt done

## 2020-05-18 NOTE — HISTORY OF PRESENT ILLNESS
[Home] : at home, [unfilled] , at the time of the visit. [Medical Office: ([facility])___] : at the medical office located in  [FreeTextEntry2] : Ms. ALLYN TUTTLE [de-identified] : Ms. ALLYN TUTTLE is a 68 year old female here for an evaluation of breast cancer. Her oncologic history is as follows:\par \par  Patient underwent a bilateral screening mammogram and ultrasound on 10/18/17, showing an area of suspicious architectural distortion in the left 2-3 o’clock position. She underwent a directed mammography and ultrasound of the left breast. It showed, suspicious mass 2 o’clock axis left breast, 4 cm from the nipple. 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 \par \par On 11/28/17, patient underwent left breast excisional biopsy of atypical ductal hyperplasia. Pathology showed ER 95% +/ [place] 85%+/ Her 2 negative, 6mm, well-differentiated invasive ductal carcinoma of the left breast. DCIS with low to intermediate nuclear grade was present, measuring 1-2mm. There was a positive margin for invasive cancer and close inked margin for DCIS. Lymph nodes were not assessed. There was no LVI present. Oncotype DX score of 9. \par \par She underwent a MRI of the breasts on 11/30/17. It showed left breast post surgical changes and no other suspicious findings within either breast. On 12/5/17, She underwent a re-excision and sentinel lymph node biopsy. There was no residual cancer identified and one negative sentinel lymph node. Currently on the IDEA trial with Dr. HYMAN [de-identified] : Ms. ALLYN TUTTLE  is here for a follow up appt for left breast cancer on endocrine therapy since 1/2018.\par Takes arimidex daily, good compliance. She has chronic wrist joint arthritis that has not worsened with starting arimidex, doesn’t need pain meds. She noted hair thinning, s/p PRP, seeing derm and getting viviscal pro. No new aches/pain, hot flashes, vag dryness , excessive fatigue, Gi s/e. She is active, no change in energy, wt or appetite. HER KIDS AND GRAND KIDS ARE WITH HER DUE TO COVID. SHE is active.. Cholesterol f/b PMD- well controlled \par mammogram  with Dr. Marquez, NRAD 10/2019 (NRAD)\par DEXA- 9/2018 -1.5 Osteopenia,  takes ca+vit D\par \par

## 2020-05-18 NOTE — CONSULT LETTER
[Dear  ___] : Dear  [unfilled], [Consult Letter:] : I had the pleasure of evaluating your patient, [unfilled]. [Please see my note below.] : Please see my note below. [Consult Closing:] : Thank you very much for allowing me to participate in the care of this patient.  If you have any questions, please do not hesitate to contact me. [Sincerely,] : Sincerely, [DrAmmy  ___] : Dr. SCHAEFFER [FreeTextEntry3] : Kim Tapia M.D.\par  of Medicine\par [facility]\par [facility] [place]\par [address] [place] [address]\par [address]\par Tele # [phone]; Fax [phone]\par

## 2020-05-18 NOTE — REASON FOR VISIT
[Follow-Up Visit] : a follow-up [Other: _____] : [unfilled] [FreeTextEntry2] : Breast cancer Left ER/ND positive HER-2 negative

## 2020-05-18 NOTE — PHYSICAL EXAM
[Fully active, able to carry on all pre-disease performance without restriction] : Status 0 - Fully active, able to carry on all pre-disease performance without restriction [Normal] : affect appropriate [de-identified] : Constitutional: well developed, well nourished, in no acute distress. \par Eyes: no icterus seen. no conjunctival injection\par ENT: no tongue swelling, no nasal discharge\par Neck: no visible masses or goitre \par Pulmonary: no audible wheeze,  respirations unlabored\par Musculoskeletal: full range of motion, walking in the house\par Skin: no rash visible on face or upper chest

## 2020-05-18 NOTE — RESULTS/DATA
[FreeTextEntry1] : Laboratory data, radiology and pathology reviewed in detail at the time of consultation.\par  no

## 2020-06-11 ENCOUNTER — OUTPATIENT (OUTPATIENT)
Dept: OUTPATIENT SERVICES | Facility: HOSPITAL | Age: 70
LOS: 1 days | Discharge: HOME | End: 2020-06-11

## 2020-06-11 DIAGNOSIS — D72.819 DECREASED WHITE BLOOD CELL COUNT, UNSPECIFIED: ICD-10-CM

## 2020-06-11 DIAGNOSIS — Z98.890 OTHER SPECIFIED POSTPROCEDURAL STATES: Chronic | ICD-10-CM

## 2020-06-15 ENCOUNTER — OUTPATIENT (OUTPATIENT)
Dept: OUTPATIENT SERVICES | Facility: HOSPITAL | Age: 70
LOS: 1 days | End: 2020-06-15
Payer: MEDICARE

## 2020-06-15 ENCOUNTER — RESULT REVIEW (OUTPATIENT)
Age: 70
End: 2020-06-15

## 2020-06-15 ENCOUNTER — APPOINTMENT (OUTPATIENT)
Dept: HEMATOLOGY ONCOLOGY | Facility: CLINIC | Age: 70
End: 2020-06-15

## 2020-06-15 ENCOUNTER — APPOINTMENT (OUTPATIENT)
Dept: MRI IMAGING | Facility: IMAGING CENTER | Age: 70
End: 2020-06-15
Payer: MEDICARE

## 2020-06-15 DIAGNOSIS — Z98.890 OTHER SPECIFIED POSTPROCEDURAL STATES: Chronic | ICD-10-CM

## 2020-06-15 DIAGNOSIS — Z00.8 ENCOUNTER FOR OTHER GENERAL EXAMINATION: ICD-10-CM

## 2020-06-15 LAB
BASOPHILS # BLD AUTO: 0.05 K/UL — SIGNIFICANT CHANGE UP (ref 0–0.2)
BASOPHILS NFR BLD AUTO: 1.3 % — SIGNIFICANT CHANGE UP (ref 0–2)
EOSINOPHIL # BLD AUTO: 0.07 K/UL — SIGNIFICANT CHANGE UP (ref 0–0.5)
EOSINOPHIL NFR BLD AUTO: 1.8 % — SIGNIFICANT CHANGE UP (ref 0–6)
HCT VFR BLD CALC: 41.3 % — SIGNIFICANT CHANGE UP (ref 34.5–45)
HGB BLD-MCNC: 13.1 G/DL — SIGNIFICANT CHANGE UP (ref 11.5–15.5)
IMM GRANULOCYTES NFR BLD AUTO: 0.3 % — SIGNIFICANT CHANGE UP (ref 0–1.5)
LYMPHOCYTES # BLD AUTO: 1.31 K/UL — SIGNIFICANT CHANGE UP (ref 1–3.3)
LYMPHOCYTES # BLD AUTO: 33.9 % — SIGNIFICANT CHANGE UP (ref 13–44)
MCHC RBC-ENTMCNC: 30.1 PG — SIGNIFICANT CHANGE UP (ref 27–34)
MCHC RBC-ENTMCNC: 31.7 GM/DL — LOW (ref 32–36)
MCV RBC AUTO: 94.9 FL — SIGNIFICANT CHANGE UP (ref 80–100)
MONOCYTES # BLD AUTO: 0.35 K/UL — SIGNIFICANT CHANGE UP (ref 0–0.9)
MONOCYTES NFR BLD AUTO: 9.1 % — SIGNIFICANT CHANGE UP (ref 2–14)
NEUTROPHILS # BLD AUTO: 2.07 K/UL — SIGNIFICANT CHANGE UP (ref 1.8–7.4)
NEUTROPHILS NFR BLD AUTO: 53.6 % — SIGNIFICANT CHANGE UP (ref 43–77)
NRBC # BLD: 0 /100 WBCS — SIGNIFICANT CHANGE UP (ref 0–0)
PLATELET # BLD AUTO: 180 K/UL — SIGNIFICANT CHANGE UP (ref 150–400)
RBC # BLD: 4.35 M/UL — SIGNIFICANT CHANGE UP (ref 3.8–5.2)
RBC # FLD: 12.2 % — SIGNIFICANT CHANGE UP (ref 10.3–14.5)
WBC # BLD: 3.86 K/UL — SIGNIFICANT CHANGE UP (ref 3.8–10.5)
WBC # FLD AUTO: 3.86 K/UL — SIGNIFICANT CHANGE UP (ref 3.8–10.5)

## 2020-06-15 PROCEDURE — 77049 MRI BREAST C-+ W/CAD BI: CPT | Mod: 26

## 2020-06-15 PROCEDURE — A9585: CPT

## 2020-06-15 PROCEDURE — C8908: CPT

## 2020-06-15 PROCEDURE — C8937: CPT

## 2020-06-16 LAB
25(OH)D3 SERPL-MCNC: 56 NG/ML
ALBUMIN SERPL ELPH-MCNC: 4.4 G/DL
ALP BLD-CCNC: 78 U/L
ALT SERPL-CCNC: 22 U/L
ANION GAP SERPL CALC-SCNC: 14 MMOL/L
AST SERPL-CCNC: 23 U/L
BILIRUB SERPL-MCNC: 0.3 MG/DL
BUN SERPL-MCNC: 16 MG/DL
CALCIUM SERPL-MCNC: 9.7 MG/DL
CHLORIDE SERPL-SCNC: 102 MMOL/L
CO2 SERPL-SCNC: 28 MMOL/L
CREAT SERPL-MCNC: 0.66 MG/DL
GLUCOSE SERPL-MCNC: 87 MG/DL
POTASSIUM SERPL-SCNC: 4.3 MMOL/L
PROT SERPL-MCNC: 6 G/DL
SARS-COV-2 IGG SERPL IA-ACNC: <0.1 INDEX
SARS-COV-2 IGG SERPL QL IA: NEGATIVE
SODIUM SERPL-SCNC: 144 MMOL/L

## 2020-07-06 ENCOUNTER — APPOINTMENT (OUTPATIENT)
Dept: SURGERY | Facility: CLINIC | Age: 70
End: 2020-07-06
Payer: MEDICARE

## 2020-07-06 PROCEDURE — 99213K: CUSTOM

## 2020-09-14 ENCOUNTER — OUTPATIENT (OUTPATIENT)
Dept: OUTPATIENT SERVICES | Facility: HOSPITAL | Age: 70
LOS: 1 days | End: 2020-09-14
Payer: MEDICARE

## 2020-09-14 ENCOUNTER — RESULT REVIEW (OUTPATIENT)
Age: 70
End: 2020-09-14

## 2020-09-14 ENCOUNTER — APPOINTMENT (OUTPATIENT)
Dept: RADIOLOGY | Facility: IMAGING CENTER | Age: 70
End: 2020-09-14
Payer: MEDICARE

## 2020-09-14 DIAGNOSIS — Z98.890 OTHER SPECIFIED POSTPROCEDURAL STATES: Chronic | ICD-10-CM

## 2020-09-14 DIAGNOSIS — C50.919 MALIGNANT NEOPLASM OF UNSPECIFIED SITE OF UNSPECIFIED FEMALE BREAST: ICD-10-CM

## 2020-09-14 PROCEDURE — 77080 DXA BONE DENSITY AXIAL: CPT

## 2020-09-14 PROCEDURE — 77080 DXA BONE DENSITY AXIAL: CPT | Mod: 26

## 2020-11-14 ENCOUNTER — OUTPATIENT (OUTPATIENT)
Dept: OUTPATIENT SERVICES | Facility: HOSPITAL | Age: 70
LOS: 1 days | Discharge: HOME | End: 2020-11-14

## 2020-11-14 DIAGNOSIS — C50.919 MALIGNANT NEOPLASM OF UNSPECIFIED SITE OF UNSPECIFIED FEMALE BREAST: ICD-10-CM

## 2020-11-14 DIAGNOSIS — D72.819 DECREASED WHITE BLOOD CELL COUNT, UNSPECIFIED: ICD-10-CM

## 2020-11-14 DIAGNOSIS — Z98.890 OTHER SPECIFIED POSTPROCEDURAL STATES: Chronic | ICD-10-CM

## 2020-11-20 ENCOUNTER — RESULT REVIEW (OUTPATIENT)
Age: 70
End: 2020-11-20

## 2020-11-20 ENCOUNTER — APPOINTMENT (OUTPATIENT)
Dept: HEMATOLOGY ONCOLOGY | Facility: CLINIC | Age: 70
End: 2020-11-20
Payer: MEDICARE

## 2020-11-20 VITALS
OXYGEN SATURATION: 100 % | HEART RATE: 64 BPM | WEIGHT: 123.46 LBS | DIASTOLIC BLOOD PRESSURE: 74 MMHG | RESPIRATION RATE: 16 BRPM | TEMPERATURE: 97.4 F | SYSTOLIC BLOOD PRESSURE: 118 MMHG | BODY MASS INDEX: 19.93 KG/M2

## 2020-11-20 LAB
BASOPHILS # BLD AUTO: 0.08 K/UL — SIGNIFICANT CHANGE UP (ref 0–0.2)
BASOPHILS NFR BLD AUTO: 1.6 % — SIGNIFICANT CHANGE UP (ref 0–2)
EOSINOPHIL # BLD AUTO: 0.07 K/UL — SIGNIFICANT CHANGE UP (ref 0–0.5)
EOSINOPHIL NFR BLD AUTO: 1.4 % — SIGNIFICANT CHANGE UP (ref 0–6)
HCT VFR BLD CALC: 42.9 % — SIGNIFICANT CHANGE UP (ref 34.5–45)
HGB BLD-MCNC: 13.9 G/DL — SIGNIFICANT CHANGE UP (ref 11.5–15.5)
IMM GRANULOCYTES NFR BLD AUTO: 0.4 % — SIGNIFICANT CHANGE UP (ref 0–1.5)
LYMPHOCYTES # BLD AUTO: 1.58 K/UL — SIGNIFICANT CHANGE UP (ref 1–3.3)
LYMPHOCYTES # BLD AUTO: 32.6 % — SIGNIFICANT CHANGE UP (ref 13–44)
MCHC RBC-ENTMCNC: 30 PG — SIGNIFICANT CHANGE UP (ref 27–34)
MCHC RBC-ENTMCNC: 32.4 G/DL — SIGNIFICANT CHANGE UP (ref 32–36)
MCV RBC AUTO: 92.5 FL — SIGNIFICANT CHANGE UP (ref 80–100)
MONOCYTES # BLD AUTO: 0.41 K/UL — SIGNIFICANT CHANGE UP (ref 0–0.9)
MONOCYTES NFR BLD AUTO: 8.5 % — SIGNIFICANT CHANGE UP (ref 2–14)
NEUTROPHILS # BLD AUTO: 2.69 K/UL — SIGNIFICANT CHANGE UP (ref 1.8–7.4)
NEUTROPHILS NFR BLD AUTO: 55.5 % — SIGNIFICANT CHANGE UP (ref 43–77)
NRBC # BLD: 0 /100 WBCS — SIGNIFICANT CHANGE UP (ref 0–0)
PLATELET # BLD AUTO: 227 K/UL — SIGNIFICANT CHANGE UP (ref 150–400)
RBC # BLD: 4.64 M/UL — SIGNIFICANT CHANGE UP (ref 3.8–5.2)
RBC # FLD: 12.1 % — SIGNIFICANT CHANGE UP (ref 10.3–14.5)
WBC # BLD: 4.85 K/UL — SIGNIFICANT CHANGE UP (ref 3.8–10.5)
WBC # FLD AUTO: 4.85 K/UL — SIGNIFICANT CHANGE UP (ref 3.8–10.5)

## 2020-11-20 PROCEDURE — 99214 OFFICE O/P EST MOD 30 MIN: CPT

## 2020-11-20 NOTE — REASON FOR VISIT
[Follow-Up Visit] : a follow-up [Other: _____] : [unfilled] [FreeTextEntry2] : Breast cancer Left ER/FL positive HER-2 negative

## 2020-11-20 NOTE — PHYSICAL EXAM
[Fully active, able to carry on all pre-disease performance without restriction] : Status 0 - Fully active, able to carry on all pre-disease performance without restriction [Normal] : normal appearance, no rash, nodules, vesicles, ulcers, erythema [de-identified] : healed lumpectomy scar

## 2020-11-20 NOTE — HISTORY OF PRESENT ILLNESS
[de-identified] : Ms. ALLYN TUTTLE is a 68 year old female here for an evaluation of breast cancer. Her oncologic history is as follows:\par \par  Patient underwent a bilateral screening mammogram and ultrasound on 10/18/17, showing an area of suspicious architectural distortion in the left 2-3 o’clock position. She underwent a directed mammography and ultrasound of the left breast. It showed, suspicious mass 2 o’clock axis left breast, 4 cm from the nipple. 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 \par \par On 11/28/17, patient underwent left breast excisional biopsy of atypical ductal hyperplasia. Pathology showed ER 95% +/ [place] 85%+/ Her 2 negative, 6mm, well-differentiated invasive ductal carcinoma of the left breast. DCIS with low to intermediate nuclear grade was present, measuring 1-2mm. There was a positive margin for invasive cancer and close inked margin for DCIS. Lymph nodes were not assessed. There was no LVI present. Oncotype DX score of 9. \par \par She underwent a MRI of the breasts on 11/30/17. It showed left breast post surgical changes and no other suspicious findings within either breast. On 12/5/17, She underwent a re-excision and sentinel lymph node biopsy. There was no residual cancer identified and one negative sentinel lymph node. Currently on the IDEA trial with Dr. HYMAN [de-identified] : Ms. ALLYN TUTTLE  is here for a follow up appt for left breast cancer on endocrine therapy since 1/2018.\par Takes arimidex daily, good compliance. She has chronic wrist joint arthritis that has not worsened with starting arimidex, doesn’t need pain meds. She noted hair thinning, s/p PRP, seeing derm and getting viviscal pro. No new aches/pain, hot flashes, vag dryness , excessive fatigue, Gi s/e. She is active, no change in energy, wt or appetite.. SHE is active. Cholesterol f/b PMD- well controlled \par mammogram  with Dr. Marquez, NRAD 10/2020 (NRAD)\par DEXA- 9/2020 -1.8 Osteopenia,  takes ca+vit D\par \par

## 2020-11-20 NOTE — CONSULT LETTER
[Dear  ___] : Dear  [unfilled], [Consult Letter:] : I had the pleasure of evaluating your patient, [unfilled]. [Please see my note below.] : Please see my note below. [Consult Closing:] : Thank you very much for allowing me to participate in the care of this patient.  If you have any questions, please do not hesitate to contact me. [Sincerely,] : Sincerely, [DrAmmy  ___] : Dr. SCHAEFFER [FreeTextEntry3] : Kim Tapia M.D.\par  of Medicine\par [facility]\par [facility] [place]\par [address] [place] [address]\par [address]\par Tele # [phone]; Fax [phone]\par

## 2020-11-20 NOTE — ASSESSMENT
[Curative] : Goals of care discussed with patient: Curative [FreeTextEntry1] : In summary, this is a 69-year-old postmenopausal  lady with stage IA (T1b, N0, M0) ER positive, AZ positive, HER-2/sohan negative invasive well-differentiated ductal carcinoma of the left breast. She is status post lumpectomy and sentinel lymph node excision. Oncotype DX recurrence score is 9. Patient has a good performance status and is generally very healthy. On IDEA, no RT\par - Breast ca: ORIN. She is tolerating anastrozole very well without significant side effects.  Reports good compliance. Plan to continue AI for  5-10 years. She is up to date with breast imaging. \par - Osteopenia: concern for worsening bone density due to anastrozole. Rec to  continue calcium and vit D. DEXA 1-2 yrs. \par - High cholesterol: concern for worsening cholesterol due to anastrozole.  Lipid profile annually.\par - AI induced arthralgia: Mild and tolerable. Rec stretching exercises, physical therapy and ortho f/u prn\par - Hair thinning- Likely 2/2 AI. Better with PRP and supplements. Continue derm f/u\par \par RTC 6 m

## 2020-11-24 LAB
25(OH)D3 SERPL-MCNC: 49 NG/ML
ALBUMIN SERPL ELPH-MCNC: 4.6 G/DL
ALP BLD-CCNC: 84 U/L
ALT SERPL-CCNC: 26 U/L
ANION GAP SERPL CALC-SCNC: 11 MMOL/L
AST SERPL-CCNC: 21 U/L
BILIRUB SERPL-MCNC: 0.4 MG/DL
BUN SERPL-MCNC: 18 MG/DL
CALCIUM SERPL-MCNC: 10 MG/DL
CHLORIDE SERPL-SCNC: 100 MMOL/L
CO2 SERPL-SCNC: 28 MMOL/L
CREAT SERPL-MCNC: 0.73 MG/DL
GLUCOSE SERPL-MCNC: 90 MG/DL
POTASSIUM SERPL-SCNC: 4.8 MMOL/L
PROT SERPL-MCNC: 6.4 G/DL
SODIUM SERPL-SCNC: 139 MMOL/L

## 2021-05-13 ENCOUNTER — OUTPATIENT (OUTPATIENT)
Dept: OUTPATIENT SERVICES | Facility: HOSPITAL | Age: 71
LOS: 1 days | Discharge: HOME | End: 2021-05-13

## 2021-05-13 DIAGNOSIS — C50.919 MALIGNANT NEOPLASM OF UNSPECIFIED SITE OF UNSPECIFIED FEMALE BREAST: ICD-10-CM

## 2021-05-13 DIAGNOSIS — Z98.890 OTHER SPECIFIED POSTPROCEDURAL STATES: Chronic | ICD-10-CM

## 2021-05-13 DIAGNOSIS — D72.819 DECREASED WHITE BLOOD CELL COUNT, UNSPECIFIED: ICD-10-CM

## 2021-05-17 ENCOUNTER — APPOINTMENT (OUTPATIENT)
Dept: HEMATOLOGY ONCOLOGY | Facility: CLINIC | Age: 71
End: 2021-05-17
Payer: MEDICARE

## 2021-05-17 ENCOUNTER — RESULT REVIEW (OUTPATIENT)
Age: 71
End: 2021-05-17

## 2021-05-17 VITALS
WEIGHT: 123.46 LBS | HEART RATE: 67 BPM | TEMPERATURE: 97.8 F | SYSTOLIC BLOOD PRESSURE: 112 MMHG | HEIGHT: 65.35 IN | BODY MASS INDEX: 20.32 KG/M2 | DIASTOLIC BLOOD PRESSURE: 68 MMHG | OXYGEN SATURATION: 97 % | RESPIRATION RATE: 16 BRPM

## 2021-05-17 LAB
BASOPHILS # BLD AUTO: 0.07 K/UL — SIGNIFICANT CHANGE UP (ref 0–0.2)
BASOPHILS NFR BLD AUTO: 1.9 % — SIGNIFICANT CHANGE UP (ref 0–2)
EOSINOPHIL # BLD AUTO: 0.05 K/UL — SIGNIFICANT CHANGE UP (ref 0–0.5)
EOSINOPHIL NFR BLD AUTO: 1.4 % — SIGNIFICANT CHANGE UP (ref 0–6)
HCT VFR BLD CALC: 41.6 % — SIGNIFICANT CHANGE UP (ref 34.5–45)
HGB BLD-MCNC: 13.8 G/DL — SIGNIFICANT CHANGE UP (ref 11.5–15.5)
IMM GRANULOCYTES NFR BLD AUTO: 0.3 % — SIGNIFICANT CHANGE UP (ref 0–1.5)
LYMPHOCYTES # BLD AUTO: 1.39 K/UL — SIGNIFICANT CHANGE UP (ref 1–3.3)
LYMPHOCYTES # BLD AUTO: 37.8 % — SIGNIFICANT CHANGE UP (ref 13–44)
MCHC RBC-ENTMCNC: 30.4 PG — SIGNIFICANT CHANGE UP (ref 27–34)
MCHC RBC-ENTMCNC: 33.2 G/DL — SIGNIFICANT CHANGE UP (ref 32–36)
MCV RBC AUTO: 91.6 FL — SIGNIFICANT CHANGE UP (ref 80–100)
MONOCYTES # BLD AUTO: 0.34 K/UL — SIGNIFICANT CHANGE UP (ref 0–0.9)
MONOCYTES NFR BLD AUTO: 9.2 % — SIGNIFICANT CHANGE UP (ref 2–14)
NEUTROPHILS # BLD AUTO: 1.82 K/UL — SIGNIFICANT CHANGE UP (ref 1.8–7.4)
NEUTROPHILS NFR BLD AUTO: 49.4 % — SIGNIFICANT CHANGE UP (ref 43–77)
NRBC # BLD: 0 /100 WBCS — SIGNIFICANT CHANGE UP (ref 0–0)
PLATELET # BLD AUTO: 221 K/UL — SIGNIFICANT CHANGE UP (ref 150–400)
RBC # BLD: 4.54 M/UL — SIGNIFICANT CHANGE UP (ref 3.8–5.2)
RBC # FLD: 11.9 % — SIGNIFICANT CHANGE UP (ref 10.3–14.5)
WBC # BLD: 3.68 K/UL — LOW (ref 3.8–10.5)
WBC # FLD AUTO: 3.68 K/UL — LOW (ref 3.8–10.5)

## 2021-05-17 PROCEDURE — 99214 OFFICE O/P EST MOD 30 MIN: CPT

## 2021-05-17 NOTE — HISTORY OF PRESENT ILLNESS
[de-identified] : Ms. ALLYN TUTTLE is a 68 year old female here for an evaluation of breast cancer. Her oncologic history is as follows:\par \par  Patient underwent a bilateral screening mammogram and ultrasound on 10/18/17, showing an area of suspicious architectural distortion in the left 2-3 o’clock position. She underwent a directed mammography and ultrasound of the left breast. It showed, suspicious mass 2 o’clock axis left breast, 4 cm from the nipple. 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 \par \par On 11/28/17, patient underwent left breast excisional biopsy of atypical ductal hyperplasia. Pathology showed ER 95% +/ SD 85%+/ Her 2 negative, 6mm, well-differentiated invasive ductal carcinoma of the left breast. DCIS with low to intermediate nuclear grade was present, measuring 1-2mm. There was a positive margin for invasive cancer and close inked margin for DCIS. Lymph nodes were not assessed. There was no LVI present. Oncotype DX score of 9. \par \par She underwent a MRI of the breasts on 11/30/17. It showed left breast post surgical changes and no other suspicious findings within either breast. On 12/5/17, She underwent a re-excision and sentinel lymph node biopsy. There was no residual cancer identified and one negative sentinel lymph node. Currently on the IDEA trial with Dr. HYMAN [de-identified] : Ms. ALLYN TUTTLE  is here for a follow up appt for left breast cancer on endocrine therapy since 1/2018.\par Takes arimidex daily, good compliance. She has chronic wrist joint arthritis that has not worsened with starting arimidex, doesn’t need pain meds. She noted hair thinning, s/p PRP, seeing derm and getting viviscal pro. No new aches/pain, hot flashes, vag dryness , excessive fatigue, Gi s/e. She is active, no change in energy, wt or appetite.. SHE is active. Cholesterol f/b PMD- well controlled \par mammogram  with Dr. Marquez, NRAD 10/2020 (NRAD)\par DEXA- 9/2020 -1.8 Osteopenia,  takes ca+vit D\par Covid vaccine x 2 \par

## 2021-05-17 NOTE — ASSESSMENT
[Curative] : Goals of care discussed with patient: Curative [FreeTextEntry1] : In summary, this is a 70-year-old postmenopausal  lady with stage IA (T1b, N0, M0) ER positive, [place] positive, HER-2/sohan negative invasive well-differentiated ductal carcinoma of the left breast. She is status post lumpectomy and sentinel lymph node excision. Oncotype DX recurrence score is 9. Patient has a good performance status and is generally very healthy. On IDEA, no RT\par - Breast ca: ORIN. She is tolerating anastrozole very well without significant side effects.  Reports good compliance. Plan to continue AI for  5-10 years. She is up to date with breast imaging. \par - Osteopenia: concern for worsening bone density due to anastrozole. Rec to  continue calcium and vit D. DEXA 1-2 yrs. \par - High cholesterol: concern for worsening cholesterol due to anastrozole.  Lipid profile annually.\par - AI induced arthralgia: Mild and tolerable. Rec stretching exercises, physical therapy and ortho f/u prn\par - Hair thinning- Likely 2/2 AI. Better with PRP and supplements. Continue derm f/u\par \par RTC 6 m

## 2021-05-17 NOTE — PHYSICAL EXAM
[Fully active, able to carry on all pre-disease performance without restriction] : Status 0 - Fully active, able to carry on all pre-disease performance without restriction [Normal] : affect appropriate [de-identified] : healed lumpectomy scar

## 2021-05-17 NOTE — REASON FOR VISIT
[Follow-Up Visit] : a follow-up [Other: _____] : [unfilled] [FreeTextEntry2] : Breast cancer Left ER/HI positive HER-2 negative

## 2021-05-17 NOTE — CONSULT LETTER
[Dear  ___] : Dear  [unfilled], [Consult Letter:] : I had the pleasure of evaluating your patient, [unfilled]. [Please see my note below.] : Please see my note below. [Consult Closing:] : Thank you very much for allowing me to participate in the care of this patient.  If you have any questions, please do not hesitate to contact me. [Sincerely,] : Sincerely, [DrAmmy  ___] : Dr. SCHAEFFER [FreeTextEntry3] : Kim Tapia M.D.\par  of Medicine\par [facility]\par [facility] [place]\par [address] [place] [address]\par [address]\par Tele # [phone]; Fax [phone]\par

## 2021-05-18 LAB
25(OH)D3 SERPL-MCNC: 58.3 NG/ML
ALBUMIN SERPL ELPH-MCNC: 4.4 G/DL
ALP BLD-CCNC: 99 U/L
ALT SERPL-CCNC: 33 U/L
ANION GAP SERPL CALC-SCNC: 11 MMOL/L
AST SERPL-CCNC: 25 U/L
BILIRUB SERPL-MCNC: 0.3 MG/DL
BUN SERPL-MCNC: 15 MG/DL
CALCIUM SERPL-MCNC: 9.8 MG/DL
CHLORIDE SERPL-SCNC: 99 MMOL/L
CO2 SERPL-SCNC: 29 MMOL/L
CREAT SERPL-MCNC: 0.68 MG/DL
GLUCOSE SERPL-MCNC: 87 MG/DL
POTASSIUM SERPL-SCNC: 4.4 MMOL/L
PROT SERPL-MCNC: 6.1 G/DL
SODIUM SERPL-SCNC: 139 MMOL/L

## 2021-06-14 ENCOUNTER — OUTPATIENT (OUTPATIENT)
Dept: OUTPATIENT SERVICES | Facility: HOSPITAL | Age: 71
LOS: 1 days | End: 2021-06-14
Payer: MEDICARE

## 2021-06-14 ENCOUNTER — APPOINTMENT (OUTPATIENT)
Dept: MRI IMAGING | Facility: CLINIC | Age: 71
End: 2021-06-14
Payer: MEDICARE

## 2021-06-14 ENCOUNTER — RESULT REVIEW (OUTPATIENT)
Age: 71
End: 2021-06-14

## 2021-06-14 DIAGNOSIS — Z98.890 OTHER SPECIFIED POSTPROCEDURAL STATES: Chronic | ICD-10-CM

## 2021-06-14 DIAGNOSIS — Z00.8 ENCOUNTER FOR OTHER GENERAL EXAMINATION: ICD-10-CM

## 2021-06-14 PROCEDURE — C8908: CPT

## 2021-06-14 PROCEDURE — 77049 MRI BREAST C-+ W/CAD BI: CPT | Mod: 26,MH

## 2021-06-14 PROCEDURE — A9585: CPT

## 2021-06-14 PROCEDURE — C8937: CPT

## 2021-09-13 ENCOUNTER — APPOINTMENT (OUTPATIENT)
Dept: SURGERY | Facility: CLINIC | Age: 71
End: 2021-09-13
Payer: MEDICARE

## 2021-09-13 PROCEDURE — 99213K: CUSTOM

## 2021-11-08 ENCOUNTER — APPOINTMENT (OUTPATIENT)
Dept: OBGYN | Facility: CLINIC | Age: 71
End: 2021-11-08
Payer: MEDICARE

## 2021-11-08 ENCOUNTER — ASOB RESULT (OUTPATIENT)
Age: 71
End: 2021-11-08

## 2021-11-08 VITALS
WEIGHT: 122 LBS | DIASTOLIC BLOOD PRESSURE: 80 MMHG | SYSTOLIC BLOOD PRESSURE: 122 MMHG | HEIGHT: 65 IN | BODY MASS INDEX: 20.33 KG/M2

## 2021-11-08 DIAGNOSIS — Z00.00 ENCOUNTER FOR GENERAL ADULT MEDICAL EXAMINATION W/OUT ABNORMAL FINDINGS: ICD-10-CM

## 2021-11-08 PROCEDURE — G0101: CPT | Mod: GA

## 2021-11-08 PROCEDURE — G0328 FECAL BLOOD SCRN IMMUNOASSAY: CPT | Mod: QW

## 2021-11-08 PROCEDURE — 76830 TRANSVAGINAL US NON-OB: CPT

## 2021-11-09 LAB — HPV HIGH+LOW RISK DNA PNL CVX: NOT DETECTED

## 2021-11-11 LAB — CYTOLOGY CVX/VAG DOC THIN PREP: ABNORMAL

## 2021-11-18 ENCOUNTER — OUTPATIENT (OUTPATIENT)
Dept: OUTPATIENT SERVICES | Facility: HOSPITAL | Age: 71
LOS: 1 days | Discharge: HOME | End: 2021-11-18

## 2021-11-18 DIAGNOSIS — Z98.890 OTHER SPECIFIED POSTPROCEDURAL STATES: Chronic | ICD-10-CM

## 2021-11-18 DIAGNOSIS — D72.819 DECREASED WHITE BLOOD CELL COUNT, UNSPECIFIED: ICD-10-CM

## 2021-11-18 DIAGNOSIS — C50.919 MALIGNANT NEOPLASM OF UNSPECIFIED SITE OF UNSPECIFIED FEMALE BREAST: ICD-10-CM

## 2021-11-22 ENCOUNTER — RESULT REVIEW (OUTPATIENT)
Age: 71
End: 2021-11-22

## 2021-11-22 ENCOUNTER — APPOINTMENT (OUTPATIENT)
Dept: HEMATOLOGY ONCOLOGY | Facility: CLINIC | Age: 71
End: 2021-11-22
Payer: MEDICARE

## 2021-11-22 VITALS
SYSTOLIC BLOOD PRESSURE: 112 MMHG | BODY MASS INDEX: 20.91 KG/M2 | WEIGHT: 125.66 LBS | RESPIRATION RATE: 16 BRPM | TEMPERATURE: 97.9 F | HEART RATE: 65 BPM | DIASTOLIC BLOOD PRESSURE: 69 MMHG | OXYGEN SATURATION: 97 %

## 2021-11-22 LAB
BASOPHILS # BLD AUTO: 0.09 K/UL — SIGNIFICANT CHANGE UP (ref 0–0.2)
BASOPHILS NFR BLD AUTO: 2 % — SIGNIFICANT CHANGE UP (ref 0–2)
EOSINOPHIL # BLD AUTO: 0.13 K/UL — SIGNIFICANT CHANGE UP (ref 0–0.5)
EOSINOPHIL NFR BLD AUTO: 2.8 % — SIGNIFICANT CHANGE UP (ref 0–6)
HCT VFR BLD CALC: 42.2 % — SIGNIFICANT CHANGE UP (ref 34.5–45)
HGB BLD-MCNC: 13.9 G/DL — SIGNIFICANT CHANGE UP (ref 11.5–15.5)
IMM GRANULOCYTES NFR BLD AUTO: 0.2 % — SIGNIFICANT CHANGE UP (ref 0–1.5)
LYMPHOCYTES # BLD AUTO: 1.16 K/UL — SIGNIFICANT CHANGE UP (ref 1–3.3)
LYMPHOCYTES # BLD AUTO: 25.2 % — SIGNIFICANT CHANGE UP (ref 13–44)
MCHC RBC-ENTMCNC: 30.5 PG — SIGNIFICANT CHANGE UP (ref 27–34)
MCHC RBC-ENTMCNC: 32.9 G/DL — SIGNIFICANT CHANGE UP (ref 32–36)
MCV RBC AUTO: 92.7 FL — SIGNIFICANT CHANGE UP (ref 80–100)
MONOCYTES # BLD AUTO: 0.44 K/UL — SIGNIFICANT CHANGE UP (ref 0–0.9)
MONOCYTES NFR BLD AUTO: 9.6 % — SIGNIFICANT CHANGE UP (ref 2–14)
NEUTROPHILS # BLD AUTO: 2.77 K/UL — SIGNIFICANT CHANGE UP (ref 1.8–7.4)
NEUTROPHILS NFR BLD AUTO: 60.2 % — SIGNIFICANT CHANGE UP (ref 43–77)
NRBC # BLD: 0 /100 WBCS — SIGNIFICANT CHANGE UP (ref 0–0)
PLATELET # BLD AUTO: 172 K/UL — SIGNIFICANT CHANGE UP (ref 150–400)
RBC # BLD: 4.55 M/UL — SIGNIFICANT CHANGE UP (ref 3.8–5.2)
RBC # FLD: 12.4 % — SIGNIFICANT CHANGE UP (ref 10.3–14.5)
WBC # BLD: 4.6 K/UL — SIGNIFICANT CHANGE UP (ref 3.8–10.5)
WBC # FLD AUTO: 4.6 K/UL — SIGNIFICANT CHANGE UP (ref 3.8–10.5)

## 2021-11-22 PROCEDURE — 99214 OFFICE O/P EST MOD 30 MIN: CPT

## 2021-11-22 RX ORDER — NEOMYCIN AND POLYMYXIN B SULFATES AND DEXAMETHASONE 3.5; 10000; 1 MG/G; [IU]/G; MG/G
3.5-10000-0.1 OINTMENT OPHTHALMIC
Qty: 4 | Refills: 0 | Status: ACTIVE | COMMUNITY
Start: 2021-09-13

## 2021-11-22 NOTE — PHYSICAL EXAM
[Fully active, able to carry on all pre-disease performance without restriction] : Status 0 - Fully active, able to carry on all pre-disease performance without restriction [Normal] : affect appropriate [de-identified] : healed lumpectomy scar

## 2021-11-22 NOTE — REASON FOR VISIT
[Follow-Up Visit] : a follow-up [Other: _____] : [unfilled] [FreeTextEntry2] : Breast cancer Left ER/GA positive HER-2 negative

## 2021-11-22 NOTE — CONSULT LETTER
[Dear  ___] : Dear  [unfilled], [Consult Letter:] : I had the pleasure of evaluating your patient, [unfilled]. [Please see my note below.] : Please see my note below. [Consult Closing:] : Thank you very much for allowing me to participate in the care of this patient.  If you have any questions, please do not hesitate to contact me. [Sincerely,] : Sincerely, [DrAmmy  ___] : Dr. SCHAEFFER [FreeTextEntry3] : Kim Tapia M.D.\par  of Medicine\par [facility]\par [facility] [place]\par [address] [place] [address]\par [address]\par Tele # [phone]; Fax [phone]\par

## 2021-11-22 NOTE — ASSESSMENT
[Curative] : Goals of care discussed with patient: Curative [FreeTextEntry1] : In summary, this is a 70-year-old postmenopausal  lady with stage IA (T1b, N0, M0) ER positive, LA positive, HER-2/sohan negative invasive well-differentiated ductal carcinoma of the left breast. She is status post lumpectomy and sentinel lymph node excision. Oncotype DX recurrence score is 9. Patient has a good performance status and is generally very healthy. On IDEA, no RT\par \par - Breast ca: ORIN. She is tolerating anastrozole very well without significant side effects.  Reports good compliance. Plan to continue AI for  5-10 years. She is up to date with breast imaging. \par - Osteopenia: concern for worsening bone density due to anastrozole. Rec to  continue calcium and vit D. DEXA 1-2 yrs. \par - High cholesterol: concern for worsening cholesterol due to anastrozole.  Lipid profile annually.\par - AI induced arthralgia: Mild and tolerable. Rec stretching exercises, physical therapy and ortho f/u prn\par - Hair thinning- Likely 2/2 AI. Better with PRP and supplements. Continue derm f/u\par \par RTC 6 m

## 2021-11-22 NOTE — HISTORY OF PRESENT ILLNESS
[de-identified] : Ms. ALLYN TUTTLE is a 68 year old female here for an evaluation of breast cancer. Her oncologic history is as follows:\par \par  Patient underwent a bilateral screening mammogram and ultrasound on 10/18/17, showing an area of suspicious architectural distortion in the left 2-3 o’clock position. She underwent a directed mammography and ultrasound of the left breast. It showed, suspicious mass 2 o’clock axis left breast, 4 cm from the nipple. 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 \par \par On 11/28/17, patient underwent left breast excisional biopsy of atypical ductal hyperplasia. Pathology showed ER 95% +/ [place] 85%+/ Her 2 negative, 6mm, well-differentiated invasive ductal carcinoma of the left breast. DCIS with low to intermediate nuclear grade was present, measuring 1-2mm. There was a positive margin for invasive cancer and close inked margin for DCIS. Lymph nodes were not assessed. There was no LVI present. Oncotype DX score of 9. \par \par She underwent a MRI of the breasts on 11/30/17. It showed left breast post surgical changes and no other suspicious findings within either breast. On 12/5/17, She underwent a re-excision and sentinel lymph node biopsy. There was no residual cancer identified and one negative sentinel lymph node. Currently on the IDEA trial with Dr. HYMAN [de-identified] : Ms. ALLYN TUTTLE  is here for a follow up appt for left breast cancer on endocrine therapy since 1/2018.\par Takes arimidex daily, good compliance. She has chronic wrist joint arthritis that has not worsened with starting arimidex, doesn’t need pain meds. She noted hair thinning, s/p PRP, seeing derm and getting viviscal pro. No new aches/pain, hot flashes, vag dryness , excessive fatigue, Gi s/e. She is active, no change in energy, wt or appetite. SHE is active. Cholesterol f/b PMD- well controlled. \par mammogram  with Dr. Marquez, NRAD 10/2020 (NRAD), mammo next month\par DEXA- 9/2020 -1.8 Osteopenia,  takes ca+vit D\par Covid vaccine x 2 \par

## 2021-11-23 LAB
25(OH)D3 SERPL-MCNC: 51.8 NG/ML
ALBUMIN SERPL ELPH-MCNC: 4.4 G/DL
ALP BLD-CCNC: 73 U/L
ALT SERPL-CCNC: 27 U/L
ANION GAP SERPL CALC-SCNC: 12 MMOL/L
AST SERPL-CCNC: 24 U/L
BILIRUB SERPL-MCNC: 0.3 MG/DL
BUN SERPL-MCNC: 15 MG/DL
CALCIUM SERPL-MCNC: 9.7 MG/DL
CHLORIDE SERPL-SCNC: 101 MMOL/L
CO2 SERPL-SCNC: 26 MMOL/L
CREAT SERPL-MCNC: 0.62 MG/DL
GLUCOSE SERPL-MCNC: 89 MG/DL
POTASSIUM SERPL-SCNC: 4.3 MMOL/L
PROT SERPL-MCNC: 6 G/DL
SODIUM SERPL-SCNC: 139 MMOL/L

## 2022-05-17 ENCOUNTER — OUTPATIENT (OUTPATIENT)
Dept: OUTPATIENT SERVICES | Facility: HOSPITAL | Age: 72
LOS: 1 days | Discharge: HOME | End: 2022-05-17

## 2022-05-17 DIAGNOSIS — Z98.890 OTHER SPECIFIED POSTPROCEDURAL STATES: Chronic | ICD-10-CM

## 2022-05-17 DIAGNOSIS — D72.819 DECREASED WHITE BLOOD CELL COUNT, UNSPECIFIED: ICD-10-CM

## 2022-05-23 ENCOUNTER — RESULT REVIEW (OUTPATIENT)
Age: 72
End: 2022-05-23

## 2022-05-23 ENCOUNTER — APPOINTMENT (OUTPATIENT)
Dept: HEMATOLOGY ONCOLOGY | Facility: CLINIC | Age: 72
End: 2022-05-23
Payer: MEDICARE

## 2022-05-23 VITALS
RESPIRATION RATE: 16 BRPM | BODY MASS INDEX: 20.79 KG/M2 | SYSTOLIC BLOOD PRESSURE: 110 MMHG | HEART RATE: 64 BPM | HEIGHT: 65 IN | WEIGHT: 124.78 LBS | DIASTOLIC BLOOD PRESSURE: 68 MMHG | TEMPERATURE: 97 F | OXYGEN SATURATION: 97 %

## 2022-05-23 LAB
25(OH)D3 SERPL-MCNC: 55.8 NG/ML
ALBUMIN SERPL ELPH-MCNC: 4.4 G/DL
ALP BLD-CCNC: 75 U/L
ALT SERPL-CCNC: 20 U/L
ANION GAP SERPL CALC-SCNC: 13 MMOL/L
AST SERPL-CCNC: 23 U/L
BASOPHILS # BLD AUTO: 0.07 K/UL — SIGNIFICANT CHANGE UP (ref 0–0.2)
BASOPHILS NFR BLD AUTO: 1.4 % — SIGNIFICANT CHANGE UP (ref 0–2)
BILIRUB SERPL-MCNC: 0.3 MG/DL
BUN SERPL-MCNC: 16 MG/DL
CALCIUM SERPL-MCNC: 9.5 MG/DL
CHLORIDE SERPL-SCNC: 99 MMOL/L
CO2 SERPL-SCNC: 26 MMOL/L
COVID-19 NUCLEOCAPSID  GAM ANTIBODY INTERPRETATION: NEGATIVE
COVID-19 SPIKE DOMAIN ANTIBODY INTERPRETATION: POSITIVE
CREAT SERPL-MCNC: 0.67 MG/DL
EGFR: 95 ML/MIN/1.73M2
EOSINOPHIL # BLD AUTO: 0.06 K/UL — SIGNIFICANT CHANGE UP (ref 0–0.5)
EOSINOPHIL NFR BLD AUTO: 1.2 % — SIGNIFICANT CHANGE UP (ref 0–6)
GLUCOSE SERPL-MCNC: 76 MG/DL
HCT VFR BLD CALC: 41.7 % — SIGNIFICANT CHANGE UP (ref 34.5–45)
HGB BLD-MCNC: 13.7 G/DL — SIGNIFICANT CHANGE UP (ref 11.5–15.5)
IMM GRANULOCYTES NFR BLD AUTO: 0.2 % — SIGNIFICANT CHANGE UP (ref 0–1.5)
LYMPHOCYTES # BLD AUTO: 1.42 K/UL — SIGNIFICANT CHANGE UP (ref 1–3.3)
LYMPHOCYTES # BLD AUTO: 29.4 % — SIGNIFICANT CHANGE UP (ref 13–44)
MCHC RBC-ENTMCNC: 30.8 PG — SIGNIFICANT CHANGE UP (ref 27–34)
MCHC RBC-ENTMCNC: 32.9 G/DL — SIGNIFICANT CHANGE UP (ref 32–36)
MCV RBC AUTO: 93.7 FL — SIGNIFICANT CHANGE UP (ref 80–100)
MONOCYTES # BLD AUTO: 0.46 K/UL — SIGNIFICANT CHANGE UP (ref 0–0.9)
MONOCYTES NFR BLD AUTO: 9.5 % — SIGNIFICANT CHANGE UP (ref 2–14)
NEUTROPHILS # BLD AUTO: 2.81 K/UL — SIGNIFICANT CHANGE UP (ref 1.8–7.4)
NEUTROPHILS NFR BLD AUTO: 58.3 % — SIGNIFICANT CHANGE UP (ref 43–77)
NRBC # BLD: 0 /100 WBCS — SIGNIFICANT CHANGE UP (ref 0–0)
PLATELET # BLD AUTO: 187 K/UL — SIGNIFICANT CHANGE UP (ref 150–400)
POTASSIUM SERPL-SCNC: 4 MMOL/L
PROT SERPL-MCNC: 6 G/DL
RBC # BLD: 4.45 M/UL — SIGNIFICANT CHANGE UP (ref 3.8–5.2)
RBC # FLD: 12.2 % — SIGNIFICANT CHANGE UP (ref 10.3–14.5)
SARS-COV-2 AB SERPL IA-ACNC: >250 U/ML
SARS-COV-2 AB SERPL QL IA: 0.08 INDEX
SODIUM SERPL-SCNC: 138 MMOL/L
WBC # BLD: 4.83 K/UL — SIGNIFICANT CHANGE UP (ref 3.8–10.5)
WBC # FLD AUTO: 4.83 K/UL — SIGNIFICANT CHANGE UP (ref 3.8–10.5)

## 2022-05-23 PROCEDURE — 99214 OFFICE O/P EST MOD 30 MIN: CPT

## 2022-05-23 NOTE — CONSULT LETTER
[Dear  ___] : Dear  [unfilled], [Consult Letter:] : I had the pleasure of evaluating your patient, [unfilled]. [Please see my note below.] : Please see my note below. [Consult Closing:] : Thank you very much for allowing me to participate in the care of this patient.  If you have any questions, please do not hesitate to contact me. [Sincerely,] : Sincerely, [DrAmmy  ___] : Dr. SCHAEFFER [FreeTextEntry3] : Kim Tapia M.D.\par  of Medicine\par [facility]\par [facility] [place]\par [address] [place] [address]\par [address]\par Tele # [phone]; Fax [phone]\par

## 2022-05-23 NOTE — ASSESSMENT
[Curative] : Goals of care discussed with patient: Curative [FreeTextEntry1] : In summary, this is a 70-year-old postmenopausal  lady with stage IA (T1b, N0, M0) ER positive, [place] positive, HER-2/sohan negative invasive well-differentiated ductal carcinoma of the left breast. She is status post lumpectomy and sentinel lymph node excision. Oncotype DX recurrence score is 9. Patient has a good performance status and is generally very healthy. On IDEA, no RT\par \par - Breast ca: ORIN. She is tolerating anastrozole very well without significant side effects.  Reports good compliance. Plan to continue AI for  5-10 years. She is up to date with breast imaging. \par - Osteopenia: concern for worsening bone density due to anastrozole. Rec to  continue calcium and vit D. DEXA 1-2 yrs. \par - High cholesterol: concern for worsening cholesterol due to anastrozole.  Lipid profile annually.\par - AI induced arthralgia: Mild and tolerable. Rec stretching exercises, physical therapy and ortho f/u prn\par - Hair thinning- Likely 2/2 AI. Better with PRP and supplements. Continue derm f/u\par Recent exposure to covid +- will check ab\par RTC 6 m

## 2022-05-23 NOTE — PHYSICAL EXAM
[Fully active, able to carry on all pre-disease performance without restriction] : Status 0 - Fully active, able to carry on all pre-disease performance without restriction [Normal] : affect appropriate [de-identified] : healed lumpectomy scar

## 2022-05-23 NOTE — REASON FOR VISIT
[Follow-Up Visit] : a follow-up [Other: _____] : [unfilled] [FreeTextEntry2] : Breast cancer Left ER/MO positive HER-2 negative

## 2022-05-23 NOTE — HISTORY OF PRESENT ILLNESS
[de-identified] : Ms. ALLYN TUTTLE is a 68 year old female here for an evaluation of breast cancer. Her oncologic history is as follows:\par \par  Patient underwent a bilateral screening mammogram and ultrasound on 10/18/17, showing an area of suspicious architectural distortion in the left 2-3 o’clock position. She underwent a directed mammography and ultrasound of the left breast. It showed, suspicious mass 2 o’clock axis left breast, 4 cm from the nipple. 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 \par \par On 11/28/17, patient underwent left breast excisional biopsy of atypical ductal hyperplasia. Pathology showed ER 95% +/ [place] 85%+/ Her 2 negative, 6mm, well-differentiated invasive ductal carcinoma of the left breast. DCIS with low to intermediate nuclear grade was present, measuring 1-2mm. There was a positive margin for invasive cancer and close inked margin for DCIS. Lymph nodes were not assessed. There was no LVI present. Oncotype DX score of 9. \par \par She underwent a MRI of the breasts on 11/30/17. It showed left breast post surgical changes and no other suspicious findings within either breast. On 12/5/17, She underwent a re-excision and sentinel lymph node biopsy. There was no residual cancer identified and one negative sentinel lymph node. Currently on the IDEA trial with Dr. HYMAN [de-identified] : Ms. ALLYN TUTTLE  is here for a follow up appt for left breast cancer on endocrine therapy since 1/2018.\par Takes arimidex daily, good compliance. She has chronic wrist joint arthritis that has not worsened with starting arimidex, doesn’t need pain meds. She noted hair thinning, s/p PRP, seeing derm and getting viviscal pro. No new aches/pain, hot flashes, vag dryness , excessive fatigue, Gi s/e. She is active, no change in energy, wt or appetite. SHE is active. Cholesterol f/b PMD- well controlled. \par mammogram  with Dr. Marquez, NRAD 10/2020 (NRAD), MRI next month\par DEXA- 9/2020 -1.8 Osteopenia,  takes ca+vit D\par Recent exposure to covid +- will check ab\par

## 2022-06-21 ENCOUNTER — OUTPATIENT (OUTPATIENT)
Dept: OUTPATIENT SERVICES | Facility: HOSPITAL | Age: 72
LOS: 1 days | End: 2022-06-21
Payer: MEDICARE

## 2022-06-21 ENCOUNTER — APPOINTMENT (OUTPATIENT)
Dept: MRI IMAGING | Facility: CLINIC | Age: 72
End: 2022-06-21
Payer: MEDICARE

## 2022-06-21 DIAGNOSIS — Z00.8 ENCOUNTER FOR OTHER GENERAL EXAMINATION: ICD-10-CM

## 2022-06-21 DIAGNOSIS — Z98.890 OTHER SPECIFIED POSTPROCEDURAL STATES: Chronic | ICD-10-CM

## 2022-06-21 PROCEDURE — C8908: CPT | Mod: MH

## 2022-06-21 PROCEDURE — 77049 MRI BREAST C-+ W/CAD BI: CPT | Mod: 26,MH

## 2022-06-21 PROCEDURE — C8937: CPT

## 2022-06-21 PROCEDURE — A9585: CPT

## 2022-09-20 ENCOUNTER — APPOINTMENT (OUTPATIENT)
Dept: RADIOLOGY | Facility: CLINIC | Age: 72
End: 2022-09-20

## 2022-09-20 ENCOUNTER — OUTPATIENT (OUTPATIENT)
Dept: OUTPATIENT SERVICES | Facility: HOSPITAL | Age: 72
LOS: 1 days | End: 2022-09-20
Payer: MEDICARE

## 2022-09-20 DIAGNOSIS — C50.919 MALIGNANT NEOPLASM OF UNSPECIFIED SITE OF UNSPECIFIED FEMALE BREAST: ICD-10-CM

## 2022-09-20 DIAGNOSIS — Z98.890 OTHER SPECIFIED POSTPROCEDURAL STATES: Chronic | ICD-10-CM

## 2022-09-20 PROCEDURE — 77080 DXA BONE DENSITY AXIAL: CPT

## 2022-09-20 PROCEDURE — 77080 DXA BONE DENSITY AXIAL: CPT | Mod: 26

## 2022-09-21 ENCOUNTER — APPOINTMENT (OUTPATIENT)
Dept: SURGERY | Facility: CLINIC | Age: 72
End: 2022-09-21

## 2022-09-21 PROCEDURE — 99213K: CUSTOM

## 2022-10-14 ENCOUNTER — NON-APPOINTMENT (OUTPATIENT)
Age: 72
End: 2022-10-14

## 2022-11-18 ENCOUNTER — OUTPATIENT (OUTPATIENT)
Dept: OUTPATIENT SERVICES | Facility: HOSPITAL | Age: 72
LOS: 1 days | Discharge: HOME | End: 2022-11-18

## 2022-11-18 DIAGNOSIS — D72.819 DECREASED WHITE BLOOD CELL COUNT, UNSPECIFIED: ICD-10-CM

## 2022-11-18 DIAGNOSIS — Z98.890 OTHER SPECIFIED POSTPROCEDURAL STATES: Chronic | ICD-10-CM

## 2022-11-23 ENCOUNTER — EMERGENCY (EMERGENCY)
Facility: HOSPITAL | Age: 72
LOS: 1 days | Discharge: HOME | End: 2022-11-23
Attending: EMERGENCY MEDICINE | Admitting: EMERGENCY MEDICINE
Payer: MEDICARE

## 2022-11-23 VITALS
RESPIRATION RATE: 16 BRPM | WEIGHT: 123.9 LBS | DIASTOLIC BLOOD PRESSURE: 64 MMHG | OXYGEN SATURATION: 97 % | SYSTOLIC BLOOD PRESSURE: 138 MMHG | HEART RATE: 84 BPM | TEMPERATURE: 98 F | HEIGHT: 67 IN

## 2022-11-23 VITALS
TEMPERATURE: 98 F | HEART RATE: 73 BPM | RESPIRATION RATE: 16 BRPM | DIASTOLIC BLOOD PRESSURE: 51 MMHG | OXYGEN SATURATION: 98 % | SYSTOLIC BLOOD PRESSURE: 124 MMHG

## 2022-11-23 DIAGNOSIS — Z98.890 OTHER SPECIFIED POSTPROCEDURAL STATES: Chronic | ICD-10-CM

## 2022-11-23 PROCEDURE — 99284 EMERGENCY DEPT VISIT MOD MDM: CPT

## 2022-11-23 PROCEDURE — 93971 EXTREMITY STUDY: CPT | Mod: 26,LT

## 2022-11-23 PROCEDURE — 99284 EMERGENCY DEPT VISIT MOD MDM: CPT | Mod: 25

## 2022-11-23 PROCEDURE — 93971 EXTREMITY STUDY: CPT

## 2022-11-23 NOTE — ED PROVIDER NOTE - PATIENT PORTAL LINK FT
You can access the FollowMyHealth Patient Portal offered by [facility] by registering at the following website: [email or web address]. By joining [employer]’s FollowMyHealth portal, you will also be able to view your health information using other applications (apps) compatible with our system.

## 2022-11-23 NOTE — ED PROVIDER NOTE - CLINICAL SUMMARY MEDICAL DECISION MAKING FREE TEXT BOX
Patient referred by her orthopedist for lower extremity Doppler due to left lower extremity edema.  Patient relates had [facility] to her foot [facility] was in a cast and switch to a boot 2 days ago.  Patient denies fevers chills chest pain short of breath calf pain.  No history of DVT or PE.  Plan lower extremity Doppler Differential including but not limited to DVT Baker's cyst

## 2022-11-23 NOTE — ED PROVIDER NOTE - OBJECTIVE STATEMENT
Patient referred by her orthopedist for lower extremity Doppler due to left lower extremity edema.  Patient relates had surgery to her foot and ankle 6 weeks ago [facility] was in a cast and switch to a boot 2 days ago.  Patient denies fevers chills chest pain short of breath calf pain.  No history of DVT or PE.

## 2022-11-23 NOTE — ED ADULT NURSE NOTE - OBJECTIVE STATEMENT
Received pt to spot OBS 9, reporting left calf swelling since Monday. Pt is s/p surgical procedure on foot from [facility]. + sensation noted to toes; toes pink. Skin normal temperature around leg brace. Pt has note from [facility] physician that states she needs STAT ultrasound. Pt AAO&3, respirations even and unlabored. Pt denies chest pain/shortness of breath. Pt denies headache/weakness/dizziness. Pt pending ultrasound.

## 2022-11-23 NOTE — ED ADULT NURSE NOTE - NSICDXPASTSURGICALHX_GEN_ALL_CORE_FT
PAST SURGICAL HISTORY:  H/O hand surgery trigger finger release right thumb 2014    S/P bunionectomy 1992, 1996

## 2022-11-23 NOTE — ED PROVIDER NOTE - PROGRESS NOTE DETAILS
Reevaluated patient at bedside.  Patient feeling well.  Discussed the results of US and copy of report given.   An opportunity to ask questions was given.  Discussed the importance of prompt, close medical follow-up.  Patient will return with any changes, concerns or persistent / worsening symptoms.  Understanding of all instructions verbalized.

## 2022-11-23 NOTE — ED ADULT NURSE NOTE - NSICDXPASTMEDICALHX_GEN_ALL_CORE_FT
PAST MEDICAL HISTORY:  Dry eyes     GERD (gastroesophageal reflux disease)     Other abnormal and inconclusive findings on diagnostic imaging of breast

## 2022-11-23 NOTE — ED ADULT NURSE REASSESSMENT NOTE - NS ED NURSE REASSESS COMMENT FT1
Pt had surgery to left foot; boot in place; had cast removed on Monday; noticed slight swelling to left calf today; awaiting radiology results. Denies any pain at this time; will continue to monitor

## 2022-11-23 NOTE — ED ADULT NURSE NOTE - NSICDXFAMILYHX_GEN_ALL_CORE_FT
FAMILY HISTORY:  Father  Still living? No  Family history of ischemic heart disease, Age at diagnosis: Age Unknown    Mother  Still living? Yes, Estimated age: Age Unknown  Family history of breast cancer, Age at diagnosis: Age Unknown  Family history of colon cancer, Age at diagnosis: Age Unknown

## 2022-11-25 ENCOUNTER — RESULT REVIEW (OUTPATIENT)
Age: 72
End: 2022-11-25

## 2022-11-25 ENCOUNTER — APPOINTMENT (OUTPATIENT)
Dept: HEMATOLOGY ONCOLOGY | Facility: CLINIC | Age: 72
End: 2022-11-25

## 2022-11-25 VITALS
TEMPERATURE: 98.1 F | OXYGEN SATURATION: 97 % | HEART RATE: 73 BPM | RESPIRATION RATE: 16 BRPM | SYSTOLIC BLOOD PRESSURE: 121 MMHG | DIASTOLIC BLOOD PRESSURE: 76 MMHG

## 2022-11-25 LAB
BASOPHILS # BLD AUTO: 0.05 K/UL — SIGNIFICANT CHANGE UP (ref 0–0.2)
BASOPHILS NFR BLD AUTO: 1 % — SIGNIFICANT CHANGE UP (ref 0–2)
EOSINOPHIL # BLD AUTO: 0.07 K/UL — SIGNIFICANT CHANGE UP (ref 0–0.5)
EOSINOPHIL NFR BLD AUTO: 1.3 % — SIGNIFICANT CHANGE UP (ref 0–6)
HCT VFR BLD CALC: 40 % — SIGNIFICANT CHANGE UP (ref 34.5–45)
HGB BLD-MCNC: 13.3 G/DL — SIGNIFICANT CHANGE UP (ref 11.5–15.5)
IMM GRANULOCYTES NFR BLD AUTO: 0.2 % — SIGNIFICANT CHANGE UP (ref 0–0.9)
LYMPHOCYTES # BLD AUTO: 1.42 K/UL — SIGNIFICANT CHANGE UP (ref 1–3.3)
LYMPHOCYTES # BLD AUTO: 27.4 % — SIGNIFICANT CHANGE UP (ref 13–44)
MCHC RBC-ENTMCNC: 30.1 PG — SIGNIFICANT CHANGE UP (ref 27–34)
MCHC RBC-ENTMCNC: 33.3 G/DL — SIGNIFICANT CHANGE UP (ref 32–36)
MCV RBC AUTO: 90.5 FL — SIGNIFICANT CHANGE UP (ref 80–100)
MONOCYTES # BLD AUTO: 0.39 K/UL — SIGNIFICANT CHANGE UP (ref 0–0.9)
MONOCYTES NFR BLD AUTO: 7.5 % — SIGNIFICANT CHANGE UP (ref 2–14)
NEUTROPHILS # BLD AUTO: 3.25 K/UL — SIGNIFICANT CHANGE UP (ref 1.8–7.4)
NEUTROPHILS NFR BLD AUTO: 62.6 % — SIGNIFICANT CHANGE UP (ref 43–77)
NRBC # BLD: 0 /100 WBCS — SIGNIFICANT CHANGE UP (ref 0–0)
PLATELET # BLD AUTO: 218 K/UL — SIGNIFICANT CHANGE UP (ref 150–400)
RBC # BLD: 4.42 M/UL — SIGNIFICANT CHANGE UP (ref 3.8–5.2)
RBC # FLD: 12.4 % — SIGNIFICANT CHANGE UP (ref 10.3–14.5)
WBC # BLD: 5.19 K/UL — SIGNIFICANT CHANGE UP (ref 3.8–10.5)
WBC # FLD AUTO: 5.19 K/UL — SIGNIFICANT CHANGE UP (ref 3.8–10.5)

## 2022-11-25 PROCEDURE — 99214 OFFICE O/P EST MOD 30 MIN: CPT

## 2022-11-25 NOTE — REASON FOR VISIT
[Follow-Up Visit] : a follow-up [Other: _____] : [unfilled] [FreeTextEntry2] : Breast cancer Left ER/[place] positive HER-2 negative

## 2022-11-25 NOTE — CONSULT LETTER
[Dear  ___] : Dear  [unfilled], [Consult Letter:] : I had the pleasure of evaluating your patient, [unfilled]. [Please see my note below.] : Please see my note below. [Consult Closing:] : Thank you very much for allowing me to participate in the care of this patient.  If you have any questions, please do not hesitate to contact me. [Sincerely,] : Sincerely, [FreeTextEntry3] : Kim Tapia M.D.\par  of Medicine\par [facility]\par [facility] [place]\par [address] [place] [address]\par [address]\par Tele # [phone]; Fax [phone]\par  [DrAmmy  ___] : Dr. SCHAEFFER

## 2022-11-25 NOTE — HISTORY OF PRESENT ILLNESS
[de-identified] : Ms. ALLYN TUTTLE is a 68 year old female here for an evaluation of breast cancer. Her oncologic history is as follows:\par \par  Patient underwent a bilateral screening mammogram and ultrasound on 10/18/17, showing an area of suspicious architectural distortion in the left 2-3 o’clock position. She underwent a directed mammography and ultrasound of the left breast. It showed, suspicious mass 2 o’clock axis left breast, 4 cm from the nipple. 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 \par \par On 11/28/17, patient underwent left breast excisional biopsy of atypical ductal hyperplasia. Pathology showed ER 95% +/ [place] 85%+/ Her 2 negative, 6mm, well-differentiated invasive ductal carcinoma of the left breast. DCIS with low to intermediate nuclear grade was present, measuring 1-2mm. There was a positive margin for invasive cancer and close inked margin for DCIS. Lymph nodes were not assessed. There was no LVI present. Oncotype DX score of 9. \par \par She underwent a MRI of the breasts on 11/30/17. It showed left breast post surgical changes and no other suspicious findings within either breast. On 12/5/17, She underwent a re-excision and sentinel lymph node biopsy. There was no residual cancer identified and one negative sentinel lymph node. Currently on the IDEA trial with Dr. HYMAN [de-identified] : Ms. ALLYN TUTTLE  is here for a follow up appt for left breast cancer on endocrine therapy since 1/2018.\par Takes arimidex daily, good compliance. She has chronic wrist joint arthritis that has not worsened with starting arimidex, doesn’t need pain meds. She noted hair thinning, s/p PRP, seeing derm and getting viviscal pro. No new aches/pain, hot flashes, vag dryness , excessive fatigue, Gi s/e. She is active, no change in energy, wt or appetite. SHE is active. Cholesterol f/b PMD- well controlled. \par mammogram  with Dr. Marquez, NRAD 10/2020 (NRAD), MRI next month\par DEXA- 9/2020 -1.8 Osteopenia, 9/2022 -2.1, takes ca+vit D\par She will complete 5 yrs in 1/2023. She has low risk T1b No, Low ODX but she has no s/e. She wishes to do extended duration. We will do 7 yrs. She will finish Jan 2025

## 2022-11-25 NOTE — PHYSICAL EXAM
[Fully active, able to carry on all pre-disease performance without restriction] : Status 0 - Fully active, able to carry on all pre-disease performance without restriction [Normal] : affect appropriate [de-identified] : healed lumpectomy scar

## 2022-11-25 NOTE — ASSESSMENT
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 She is status post lumpectomy and sentinel lymph node excision. Oncotype DX recurrence score is 9. Patient has a good performance status and is generally very healthy. On IDEA, no RT\par \par - Breast ca: ORIN. She is tolerating anastrozole very well without significant side effects.  Reports good compliance. Plan to continue AI for 7 years. She is up to date with breast imaging. \par She will complete 5 yrs in 1/2023. She has low risk T1b No, Low ODX but she has no s/e. She wishes to do extended duration. We will do 7 yrs. She will finish Jan 2025\par - Osteopenia: concern for worsening bone density due to anastrozole. Rec to  continue calcium and vit D. DEXA 1-2 yrs. \par - High cholesterol: concern for worsening cholesterol due to anastrozole.  Lipid profile annually.\par - AI induced arthralgia: Mild and tolerable. Rec stretching exercises, physical therapy and ortho f/u prn\par - Hair thinning- Likely 2/2 AI. Better with PRP and supplements. Continue derm f/u\par RTC 6 m [Curative] : Goals of care discussed with patient: Curative

## 2022-11-29 LAB
ALBUMIN SERPL ELPH-MCNC: 4.3 G/DL
ALP BLD-CCNC: 67 U/L
ALT SERPL-CCNC: 25 U/L
ANION GAP SERPL CALC-SCNC: 12 MMOL/L
AST SERPL-CCNC: 24 U/L
BILIRUB SERPL-MCNC: 0.5 MG/DL
BUN SERPL-MCNC: 12 MG/DL
CALCIUM SERPL-MCNC: 9.6 MG/DL
CHLORIDE SERPL-SCNC: 100 MMOL/L
CO2 SERPL-SCNC: 27 MMOL/L
CREAT SERPL-MCNC: 0.62 MG/DL
EGFR: 96 ML/MIN/1.73M2
GLUCOSE SERPL-MCNC: 88 MG/DL
POTASSIUM SERPL-SCNC: 3.9 MMOL/L
PROT SERPL-MCNC: 6.1 G/DL
SODIUM SERPL-SCNC: 139 MMOL/L

## 2023-03-14 ENCOUNTER — ASOB RESULT (OUTPATIENT)
Age: 73
End: 2023-03-14

## 2023-03-14 ENCOUNTER — APPOINTMENT (OUTPATIENT)
Dept: OBGYN | Facility: CLINIC | Age: 73
End: 2023-03-14
Payer: MEDICARE

## 2023-03-14 VITALS
HEIGHT: 64 IN | WEIGHT: 123 LBS | DIASTOLIC BLOOD PRESSURE: 74 MMHG | BODY MASS INDEX: 21 KG/M2 | SYSTOLIC BLOOD PRESSURE: 146 MMHG

## 2023-03-14 VITALS — DIASTOLIC BLOOD PRESSURE: 77 MMHG | SYSTOLIC BLOOD PRESSURE: 145 MMHG

## 2023-03-14 PROCEDURE — G0101: CPT | Mod: GA

## 2023-03-14 PROCEDURE — G0328 FECAL BLOOD SCRN IMMUNOASSAY: CPT | Mod: GA,QW

## 2023-03-14 PROCEDURE — 76830 TRANSVAGINAL US NON-OB: CPT

## 2023-03-14 NOTE — PLAN
[FreeTextEntry1] : 72 year old female presents for routine gyn exam - ADH of breast, stage I breast ca, osteopenia, atrophy, breast lumpectomy\par BSE taught\par Breast and pelvic exam performed\par 10/20 colonoscopy, due in 10/25\par Todays Pelvic sono was wnl. Repeat in 3/24\par \par Hx of breast ca\par 12/22 mammogram and breast sonogram, due in 12/23 year\par 6/22 Breast MRI, due in 6/23\par Continue to f/u with \par c/w Anastrozole\par \par Osteopenia:\par 9/22 DEXA bone density, due in 9/24\par D/w pt Calcium 500 mg and Vitamin D 1000U intake, as well as weight-bearing exercise, such as walking, for 30 min daily\par  \par RTO in 1 year or PRN

## 2023-03-14 NOTE — HISTORY OF PRESENT ILLNESS
[Patient reported mammogram was normal] : Patient reported mammogram was normal [Patient reported breast sonogram was normal] : Patient reported breast sonogram was normal [Patient reported bone density results were abnormal] : Patient reported bone density results were abnormal [Patient reported colonoscopy was normal] : Patient reported colonoscopy was normal [FreeTextEntry1] : 2023. ALLYN TUTTLE 72 year old female  LMP . She presents for an annual gyn exam \par \par She feels well and offers no complaints. She denies vaginal bleeding, abnormal vaginal discharge or vaginitis sxs. No urinary complaints. BM is normal per patient, no bloody stool. She denies abdominal and pelvic pain.\par \par She follows up w/ Dr. Marquez yearly. No new medical conditions, medications or surgeries.\par \par Pt took cold medicine this morning and attributes it to her high blood pressure during todays visit\par \par GynHx: ADH of breast, stage I breast ca, osteopenia\par PMH: reflux\par SHx:  breast lumpectomy, cataracts surgery, foot surgery\par Allergies: NKDA\par Meds: Anastrazole, Pantoprazole, vitamins, probiotic\par FHx: Mother and MGM and MGF had colon ca. Mother had breast ca. Denies FHx of ovarian, or uterine cancer. \par \par 4 grandkids [TextBox_4] : Breast MRI-6/22- wnl\par Pelvic sono-today- wnl [Mammogramdate] : 12/22 [BreastSonogramDate] : 12/22 [BoneDensityDate] : 9/22 [TextBox_37] : osteopenia w/ normal frax [ColonoscopyDate] : 10/20

## 2023-06-13 ENCOUNTER — OUTPATIENT (OUTPATIENT)
Dept: OUTPATIENT SERVICES | Facility: HOSPITAL | Age: 73
LOS: 1 days | Discharge: HOME | End: 2023-06-13

## 2023-06-13 DIAGNOSIS — C50.919 MALIGNANT NEOPLASM OF UNSPECIFIED SITE OF UNSPECIFIED FEMALE BREAST: ICD-10-CM

## 2023-06-13 DIAGNOSIS — Z98.890 OTHER SPECIFIED POSTPROCEDURAL STATES: Chronic | ICD-10-CM

## 2023-06-16 ENCOUNTER — RESULT REVIEW (OUTPATIENT)
Age: 73
End: 2023-06-16

## 2023-06-16 ENCOUNTER — APPOINTMENT (OUTPATIENT)
Dept: HEMATOLOGY ONCOLOGY | Facility: CLINIC | Age: 73
End: 2023-06-16
Payer: MEDICARE

## 2023-06-16 VITALS
TEMPERATURE: 98 F | RESPIRATION RATE: 16 BRPM | HEART RATE: 2 BPM | BODY MASS INDEX: 20.43 KG/M2 | DIASTOLIC BLOOD PRESSURE: 67 MMHG | SYSTOLIC BLOOD PRESSURE: 125 MMHG | WEIGHT: 119.05 LBS | OXYGEN SATURATION: 98 %

## 2023-06-16 LAB
BASOPHILS # BLD AUTO: 0.06 K/UL — SIGNIFICANT CHANGE UP (ref 0–0.2)
BASOPHILS NFR BLD AUTO: 1 % — SIGNIFICANT CHANGE UP (ref 0–2)
EOSINOPHIL # BLD AUTO: 0.05 K/UL — SIGNIFICANT CHANGE UP (ref 0–0.5)
EOSINOPHIL NFR BLD AUTO: 0.9 % — SIGNIFICANT CHANGE UP (ref 0–6)
HCT VFR BLD CALC: 39.2 % — SIGNIFICANT CHANGE UP (ref 34.5–45)
HGB BLD-MCNC: 13.2 G/DL — SIGNIFICANT CHANGE UP (ref 11.5–15.5)
IMM GRANULOCYTES NFR BLD AUTO: 0.2 % — SIGNIFICANT CHANGE UP (ref 0–0.9)
LYMPHOCYTES # BLD AUTO: 1.5 K/UL — SIGNIFICANT CHANGE UP (ref 1–3.3)
LYMPHOCYTES # BLD AUTO: 26 % — SIGNIFICANT CHANGE UP (ref 13–44)
MCHC RBC-ENTMCNC: 30.8 PG — SIGNIFICANT CHANGE UP (ref 27–34)
MCHC RBC-ENTMCNC: 33.7 G/DL — SIGNIFICANT CHANGE UP (ref 32–36)
MCV RBC AUTO: 91.4 FL — SIGNIFICANT CHANGE UP (ref 80–100)
MONOCYTES # BLD AUTO: 0.3 K/UL — SIGNIFICANT CHANGE UP (ref 0–0.9)
MONOCYTES NFR BLD AUTO: 5.2 % — SIGNIFICANT CHANGE UP (ref 2–14)
NEUTROPHILS # BLD AUTO: 3.84 K/UL — SIGNIFICANT CHANGE UP (ref 1.8–7.4)
NEUTROPHILS NFR BLD AUTO: 66.7 % — SIGNIFICANT CHANGE UP (ref 43–77)
NRBC # BLD: 0 /100 WBCS — SIGNIFICANT CHANGE UP (ref 0–0)
PLATELET # BLD AUTO: 202 K/UL — SIGNIFICANT CHANGE UP (ref 150–400)
RBC # BLD: 4.29 M/UL — SIGNIFICANT CHANGE UP (ref 3.8–5.2)
RBC # FLD: 11.9 % — SIGNIFICANT CHANGE UP (ref 10.3–14.5)
WBC # BLD: 5.76 K/UL — SIGNIFICANT CHANGE UP (ref 3.8–10.5)
WBC # FLD AUTO: 5.76 K/UL — SIGNIFICANT CHANGE UP (ref 3.8–10.5)

## 2023-06-16 PROCEDURE — 99214 OFFICE O/P EST MOD 30 MIN: CPT

## 2023-06-16 NOTE — REASON FOR VISIT
[Follow-Up Visit] : a follow-up [Other: _____] : [unfilled] [FreeTextEntry2] : Breast cancer Left ER/AK positive HER-2 negative

## 2023-06-16 NOTE — PHYSICAL EXAM
[Fully active, able to carry on all pre-disease performance without restriction] : Status 0 - Fully active, able to carry on all pre-disease performance without restriction [Normal] : affect appropriate [de-identified] : healed lumpectomy scar

## 2023-06-16 NOTE — ASSESSMENT
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 She is status post lumpectomy and sentinel lymph node excision. Oncotype DX recurrence score is 9. Patient has a good performance status and is generally very healthy. On IDEA, no RT\par \par - Breast ca: ORIN. She is tolerating anastrozole very well without significant side effects.  Reports good compliance. Plan to continue AI for 7 years. She is up to date with breast imaging. \par She will complete 5 yrs in 1/2023. She has low risk T1b No, Low ODX but she has no s/e. She wishes to do extended duration. We will do 7 yrs. She will finish Jan 2025\par - Osteopenia: concern for worsening bone density due to anastrozole. Rec to  continue calcium and vit D. DEXA 1-2 yrs. \par - High cholesterol: concern for worsening cholesterol due to anastrozole.  Lipid profile annually.\par - AI induced arthralgia: Mild and tolerable. Rec stretching exercises, physical therapy and ortho f/u prn\par - Hair thinning- Likely 2/2 AI. Better with PRP and supplements. Continue derm f/u\par RTC 6 m [Curative] : Goals of care discussed with patient: Curative

## 2023-06-16 NOTE — CONSULT LETTER
[Dear  ___] : Dear  [unfilled], [Consult Letter:] : I had the pleasure of evaluating your patient, [unfilled]. [Please see my note below.] : Please see my note below. [Consult Closing:] : Thank you very much for allowing me to participate in the care of this patient.  If you have any questions, please do not hesitate to contact me. [Sincerely,] : Sincerely, [FreeTextEntry3] : Kim Tapia M.D.\par  of Medicine\par [facility]\par [facility] [place]\par [address] [place] [address]\par [address]\par Tele # [phone]; Fax [phone]\par  [DrAmmy  ___] : Dr. SCHAEFFER A-T Advancement Flap Text: The defect edges were debeveled with a #15 scalpel blade.  Given the location of the defect, shape of the defect and the proximity to free margins an A-T advancement flap was deemed most appropriate.  Using a sterile surgical marker, an appropriate advancement flap was drawn incorporating the defect and placing the expected incisions within the relaxed skin tension lines where possible.    The area thus outlined was incised deep to adipose tissue with a #15 scalpel blade.  The skin margins were undermined to an appropriate distance in all directions utilizing iris scissors.

## 2023-06-16 NOTE — HISTORY OF PRESENT ILLNESS
[de-identified] : Ms. ALLYN TUTTLE is a 68 year old female here for an evaluation of breast cancer. Her oncologic history is as follows:\par \par  Patient underwent a bilateral screening mammogram and ultrasound on 10/18/17, showing an area of suspicious architectural distortion in the left 2-3 o’clock position. She underwent a directed mammography and ultrasound of the left breast. It showed, suspicious mass 2 o’clock axis left breast, 4 cm from the nipple. 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 \par \par On 11/28/17, patient underwent left breast excisional biopsy of atypical ductal hyperplasia. Pathology showed ER 95% +/ PA 85%+/ Her 2 negative, 6mm, well-differentiated invasive ductal carcinoma of the left breast. DCIS with low to intermediate nuclear grade was present, measuring 1-2mm. There was a positive margin for invasive cancer and close inked margin for DCIS. Lymph nodes were not assessed. There was no LVI present. Oncotype DX score of 9. \par \par She underwent a MRI of the breasts on 11/30/17. It showed left breast post surgical changes and no other suspicious findings within either breast. On 12/5/17, She underwent a re-excision and sentinel lymph node biopsy. There was no residual cancer identified and one negative sentinel lymph node. Currently on the IDEA trial with Dr. HYMAN [de-identified] : Ms. ALLYN TUTTLE  is here for a follow up appt for left breast cancer on endocrine therapy since 1/2018.\par Takes arimidex daily, good compliance. She has chronic wrist joint arthritis that has not worsened with starting arimidex, doesn’t need pain meds. She noted hair thinning, s/p PRP, seeing derm and getting viviscal pro. No new aches/pain, hot flashes, vag dryness , excessive fatigue, Gi s/e. She is active, no change in energy, wt or appetite. SHE is active. Cholesterol f/b PMD- well controlled. \par mammogram  with Dr. Marquez, NRAD 10/2020 (NRAD), MRI next month\par DEXA- 9/2020 -1.8 Osteopenia, 9/2022 -2.1, takes ca+vit D\par She will complete 5 yrs in 1/2023. She has low risk T1b No, Low ODX but she has no s/e. She wishes to do extended duration. We will do 7 yrs. She will finish Jan 2025\par Her 97 yrs old mom is in hospice.

## 2023-06-22 LAB
ALBUMIN SERPL ELPH-MCNC: 4.3 G/DL
ALP BLD-CCNC: 78 U/L
ALT SERPL-CCNC: 16 U/L
ANION GAP SERPL CALC-SCNC: 12 MMOL/L
AST SERPL-CCNC: 21 U/L
BILIRUB SERPL-MCNC: 0.4 MG/DL
BUN SERPL-MCNC: 15 MG/DL
CALCIUM SERPL-MCNC: 9.5 MG/DL
CHLORIDE SERPL-SCNC: 104 MMOL/L
CO2 SERPL-SCNC: 24 MMOL/L
CREAT SERPL-MCNC: 0.66 MG/DL
EGFR: 94 ML/MIN/1.73M2
GLUCOSE SERPL-MCNC: 147 MG/DL
POTASSIUM SERPL-SCNC: 4.4 MMOL/L
PROT SERPL-MCNC: 5.9 G/DL
SODIUM SERPL-SCNC: 140 MMOL/L

## 2023-06-28 ENCOUNTER — APPOINTMENT (OUTPATIENT)
Dept: MRI IMAGING | Facility: CLINIC | Age: 73
End: 2023-06-28
Payer: MEDICARE

## 2023-06-28 ENCOUNTER — OUTPATIENT (OUTPATIENT)
Dept: OUTPATIENT SERVICES | Facility: HOSPITAL | Age: 73
LOS: 1 days | End: 2023-06-28
Payer: MEDICARE

## 2023-06-28 DIAGNOSIS — Z98.890 OTHER SPECIFIED POSTPROCEDURAL STATES: Chronic | ICD-10-CM

## 2023-06-28 DIAGNOSIS — Z00.8 ENCOUNTER FOR OTHER GENERAL EXAMINATION: ICD-10-CM

## 2023-06-28 PROCEDURE — C8908: CPT | Mod: MH

## 2023-06-28 PROCEDURE — 77049 MRI BREAST C-+ W/CAD BI: CPT | Mod: 26,MH

## 2023-06-28 PROCEDURE — A9585: CPT

## 2023-06-28 PROCEDURE — C8937: CPT

## 2023-09-18 ENCOUNTER — APPOINTMENT (OUTPATIENT)
Dept: SURGERY | Facility: CLINIC | Age: 73
End: 2023-09-18
Payer: MEDICARE

## 2023-09-18 PROCEDURE — 99213K: CUSTOM

## 2023-12-01 ENCOUNTER — OUTPATIENT (OUTPATIENT)
Dept: OUTPATIENT SERVICES | Facility: HOSPITAL | Age: 73
LOS: 1 days | Discharge: HOME | End: 2023-12-01

## 2023-12-01 DIAGNOSIS — C50.919 MALIGNANT NEOPLASM OF UNSPECIFIED SITE OF UNSPECIFIED FEMALE BREAST: ICD-10-CM

## 2023-12-01 DIAGNOSIS — Z98.890 OTHER SPECIFIED POSTPROCEDURAL STATES: Chronic | ICD-10-CM

## 2023-12-01 DIAGNOSIS — D72.819 DECREASED WHITE BLOOD CELL COUNT, UNSPECIFIED: ICD-10-CM

## 2024-01-10 ENCOUNTER — APPOINTMENT (OUTPATIENT)
Dept: HEMATOLOGY ONCOLOGY | Facility: CLINIC | Age: 74
End: 2024-01-10
Payer: MEDICARE

## 2024-01-10 VITALS
TEMPERATURE: 96.8 F | WEIGHT: 120 LBS | RESPIRATION RATE: 17 BRPM | OXYGEN SATURATION: 97 % | DIASTOLIC BLOOD PRESSURE: 69 MMHG | SYSTOLIC BLOOD PRESSURE: 120 MMHG | BODY MASS INDEX: 20.49 KG/M2 | HEART RATE: 95 BPM | HEIGHT: 63.98 IN

## 2024-01-10 DIAGNOSIS — Z79.811 LONG TERM (CURRENT) USE OF AROMATASE INHIBITORS: ICD-10-CM

## 2024-01-10 DIAGNOSIS — T45.1X5A PAIN IN UNSPECIFIED JOINT: ICD-10-CM

## 2024-01-10 DIAGNOSIS — R23.2 FLUSHING: ICD-10-CM

## 2024-01-10 DIAGNOSIS — T45.1X5A FLUSHING: ICD-10-CM

## 2024-01-10 DIAGNOSIS — L65.9 NONSCARRING HAIR LOSS, UNSPECIFIED: ICD-10-CM

## 2024-01-10 DIAGNOSIS — M25.50 PAIN IN UNSPECIFIED JOINT: ICD-10-CM

## 2024-01-10 PROCEDURE — 99214 OFFICE O/P EST MOD 30 MIN: CPT

## 2024-01-10 RX ORDER — ANASTROZOLE TABLETS 1 MG/1
1 TABLET ORAL DAILY
Qty: 1 | Refills: 1 | Status: ACTIVE | COMMUNITY
Start: 2018-01-05 | End: 1900-01-01

## 2024-01-10 NOTE — RESULTS/DATA
[FreeTextEntry1] : Laboratory data, radiology and pathology reviewed in detail at the time of consultation.\par  
+vaginal bleeding

## 2024-01-10 NOTE — CONSULT LETTER
[Dear  ___] : Dear  [unfilled], [Consult Letter:] : I had the pleasure of evaluating your patient, [unfilled]. [Please see my note below.] : Please see my note below. [Consult Closing:] : Thank you very much for allowing me to participate in the care of this patient.  If you have any questions, please do not hesitate to contact me. [Sincerely,] : Sincerely, [DrAmmy  ___] : Dr. SCHAEFFER [FreeTextEntry3] : Kim Tapia M.D.\par   of Medicine\par  [facility]\par  [facility] [place]\par  [address] [place] [address]\par  [address]\par  Tele # [phone]; Fax [phone]\par

## 2024-01-10 NOTE — PHYSICAL EXAM
[Fully active, able to carry on all pre-disease performance without restriction] : Status 0 - Fully active, able to carry on all pre-disease performance without restriction [Normal] : affect appropriate [de-identified] : healed lumpectomy scar

## 2024-01-10 NOTE — REASON FOR VISIT
[Follow-Up Visit] : a follow-up [Other: _____] : [unfilled] [FreeTextEntry2] : Breast cancer Left ER/[place] positive HER-2 negative

## 2024-01-10 NOTE — ASSESSMENT
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 She is status post lumpectomy and sentinel lymph node excision. Oncotype DX recurrence score is 9. Patient has a good performance status and is generally very healthy. On IDEA, no RT  - Breast ca: ORIN. She is tolerating anastrozole very well without significant side effects. Reports good compliance. Plan to continue AI for 7 years. She is up to date with breast imaging. She will complete 5 yrs in 1/2023. She has low risk T1b No, Low ODX but she has no s/e. She wishes to do extended duration. We will do 7 yrs. She will finish Jan 2025  - Osteopenia: concern for worsening bone density due to anastrozole. Rec to continue calcium and vit D. DEXA 1-2 yrs. - High cholesterol: concern for worsening cholesterol due to anastrozole. Lipid profile annually. - AI induced arthralgia: Mild and tolerable. Rec stretching exercises, physical therapy and ortho f/u prn - Hair thinning- Likely 2/2 AI. Better with PRP and supplements. Continue derm f/u  RTC 6 m.

## 2024-01-10 NOTE — HISTORY OF PRESENT ILLNESS
[de-identified] : Ms. ALLYN TUTTLE is a 68 year old female here for an evaluation of breast cancer. Her oncologic history is as follows:   Patient underwent a bilateral screening mammogram and ultrasound on 10/18/17, showing an area of suspicious architectural distortion in the left 2-3 o'clock position. She underwent a directed mammography and ultrasound of the left breast. It showed, suspicious mass 2 o'clock axis left breast, 4 cm from the nipple. 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   On 11/28/17, patient underwent left breast excisional biopsy of atypical ductal hyperplasia. Pathology showed ER 95% +/ [place] 85%+/ Her 2 negative, 6mm, well-differentiated invasive ductal carcinoma of the left breast. DCIS with low to intermediate nuclear grade was present, measuring 1-2mm. There was a positive margin for invasive cancer and close inked margin for DCIS. Lymph nodes were not assessed. There was no LVI present. Oncotype DX score of 9.   She underwent a MRI of the breasts on 11/30/17. It showed left breast post surgical changes and no other suspicious findings within either breast. On 12/5/17, She underwent a re-excision and sentinel lymph node biopsy. There was no residual cancer identified and one negative sentinel lymph node. Currently on the IDEA trial with Dr. HYMAN [de-identified] : Ms. ALLYN TUTTLE  is here for a follow up appt for left breast cancer on endocrine therapy since 1/2018. Takes arimidex daily, good compliance. She has chronic wrist joint arthritis that has not worsened with starting arimidex, doesn't need pain meds. She noted hair thinning, s/p PRP, seeing derm and getting viviscal pro. No new aches/pain, hot flashes, vag dryness , excessive fatigue, Gi s/e. She is active, no change in energy, wt or appetite. SHE is active. Cholesterol f/b PMD- well controlled.  mammogram  with Dr. Marquez, NRAD 10/2020 (NRAD), MRI next month DEXA- 9/2020 -1.8 Osteopenia, 9/2022 -2.1, takes ca+vit D She will complete 5 yrs in 1/2023. She has low risk T1b No, Low ODX but she has no s/e. She wishes to do extended duration. We will do 7 yrs. She will finish Jan 2025 Her 97 yrs old mom is in hospice.

## 2024-02-06 NOTE — RESULTS/DATA
Attempted to schedule. LVM    [FreeTextEntry1] : Laboratory data, radiology and pathology reviewed in detail at the time of consultation.\par

## 2024-03-15 ENCOUNTER — APPOINTMENT (OUTPATIENT)
Dept: OBGYN | Facility: CLINIC | Age: 74
End: 2024-03-15
Payer: MEDICARE

## 2024-03-15 ENCOUNTER — ASOB RESULT (OUTPATIENT)
Age: 74
End: 2024-03-15

## 2024-03-15 VITALS
SYSTOLIC BLOOD PRESSURE: 122 MMHG | DIASTOLIC BLOOD PRESSURE: 70 MMHG | BODY MASS INDEX: 20.33 KG/M2 | HEIGHT: 65 IN | WEIGHT: 122 LBS

## 2024-03-15 DIAGNOSIS — C50.919 MALIGNANT NEOPLASM OF UNSPECIFIED SITE OF UNSPECIFIED FEMALE BREAST: ICD-10-CM

## 2024-03-15 DIAGNOSIS — M85.80 OTHER SPECIFIED DISORDERS OF BONE DENSITY AND STRUCTURE, UNSPECIFIED SITE: ICD-10-CM

## 2024-03-15 DIAGNOSIS — N90.5 ATROPHY OF VULVA: ICD-10-CM

## 2024-03-15 DIAGNOSIS — Z01.411 ENCOUNTER FOR GYNECOLOGICAL EXAMINATION (GENERAL) (ROUTINE) WITH ABNORMAL FINDINGS: ICD-10-CM

## 2024-03-15 PROCEDURE — G0328 FECAL BLOOD SCRN IMMUNOASSAY: CPT | Mod: GA,QW

## 2024-03-15 PROCEDURE — 76830 TRANSVAGINAL US NON-OB: CPT

## 2024-03-15 PROCEDURE — G0101: CPT | Mod: GA

## 2024-03-15 NOTE — HISTORY OF PRESENT ILLNESS
[Patient reported mammogram was normal] : Patient reported mammogram was normal [Patient reported breast sonogram was normal] : Patient reported breast sonogram was normal [Patient reported bone density results were normal] : Patient reported bone density results were normal [Patient reported colonoscopy was normal] : Patient reported colonoscopy was normal [FreeTextEntry1] : 03/15/2024. ALLYN TUTTLE 73 year old female   LMP . PMH  ADH of breast, stage I breast ca, s/p lumpectomy, osteopenia. She presents for an annual gyn exam   She feels well and offers no complaints. She denies vaginal bleeding, abnormal vaginal discharge or vaginitis sxs. No urinary complaints. BM is normal per patient, no bloody stool. She denies abdominal and pelvic pain.  She is sexually active with her . Denies sexual dysfunction.   No new medical conditions, medications or surgeries.  Pt sees Dr. Marquez and oncologist Dr. Tapia yearly. Pt is on year 7 of Anastrazole.   GynHx: ADH of breast, stage I breast ca, osteopenia PMH: reflux SHx: breast lumpectomy, cataracts surgery, foot surgery x2 (, ) Allergies: NKDA Meds: Anastrazole, Pantoprazole, vitamins, probiotic FHx: Mother and MGM and MGF had colon ca. Mother had breast ca. Denies FHx of ovarian, or uterine cancer. 4 grandkids [TextBox_4] : pelvic sono today- endometrial lining measures 2mm, no fluid, normal ovaries  [BreastSonogramDate] : 12/2023 [Mammogramdate] : 12/2023 [TextBox_37] : osteopenia w/ normal FRAX [BoneDensityDate] : 09/2022 [ColonoscopyDate] : 12/2023

## 2024-03-15 NOTE — PLAN
[FreeTextEntry1] : 73 year old female presents for routine gyn exam - urethral caruncle, ADH of breast, stage I breast ca, s/p lumpectomy, osteopenia, FHx of cancer BSE taught Breast and pelvic exam performed Pap/HPV conducted 12/2023 mammogram and breast sonogram. Rx given, due in 12/2024 12/2023 colonoscopy, due in 12/2028  Osteopenia: 09/2022 DEXA bone density, due in 09/2024 D/w pt Calcium 500 mg and Vitamin D 1000U intake, as well as weight-bearing exercise, such as walking, for 30 min daily  Hx of breast ca Pt to continue to f/u with Dr. Marquez   Urethral caruncle Pt asymptomatic Advised pt if it becomes bothersome to apply Balmex/Aquaphor to area  FHx of ca Discussed w/ pt expanded genetic testing  pelvic sono reviewed-normal  RTO in 1 year or PRN

## 2024-03-15 NOTE — PHYSICAL EXAM
[Chaperone Present] : A chaperone was present in the examining room during all aspects of the physical examination [Appropriately responsive] : appropriately responsive [No Acute Distress] : no acute distress [Alert] : alert [No Lymphadenopathy] : no lymphadenopathy [No Murmurs] : no murmurs [Regular Rate Rhythm] : regular rate rhythm [Clear to Auscultation B/L] : clear to auscultation bilaterally [Soft] : soft [Non-tender] : non-tender [No HSM] : No HSM [Non-distended] : non-distended [No Lesions] : no lesions [No Mass] : no mass [Oriented x3] : oriented x3 [Examination Of The Breasts] : a normal appearance [No Masses] : no breast masses were palpable [Vulvar Atrophy] : vulvar atrophy [Labia Majora] : normal [Labia Minora] : normal [Urethral Caruncle] : urethral caruncle [Atrophy] : atrophy [Normal] : normal [Uterine Adnexae] : normal [FreeTextEntry9] : Guaiac negative. No masses noted.

## 2024-03-18 LAB — HPV HIGH+LOW RISK DNA PNL CVX: NOT DETECTED

## 2024-03-20 LAB — CYTOLOGY CVX/VAG DOC THIN PREP: ABNORMAL

## 2024-06-29 ENCOUNTER — APPOINTMENT (OUTPATIENT)
Dept: MRI IMAGING | Facility: CLINIC | Age: 74
End: 2024-06-29
Payer: MEDICARE

## 2024-06-29 ENCOUNTER — OUTPATIENT (OUTPATIENT)
Dept: OUTPATIENT SERVICES | Facility: HOSPITAL | Age: 74
LOS: 1 days | End: 2024-06-29
Payer: MEDICARE

## 2024-06-29 DIAGNOSIS — Z98.890 OTHER SPECIFIED POSTPROCEDURAL STATES: Chronic | ICD-10-CM

## 2024-06-29 DIAGNOSIS — R92.8 OTHER ABNORMAL AND INCONCLUSIVE FINDINGS ON DIAGNOSTIC IMAGING OF BREAST: ICD-10-CM

## 2024-06-29 PROCEDURE — C8937: CPT

## 2024-06-29 PROCEDURE — C8908: CPT | Mod: MH

## 2024-06-29 PROCEDURE — A9585: CPT

## 2024-06-29 PROCEDURE — 77049 MRI BREAST C-+ W/CAD BI: CPT | Mod: 26,MH

## 2024-07-03 ENCOUNTER — OUTPATIENT (OUTPATIENT)
Dept: OUTPATIENT SERVICES | Facility: HOSPITAL | Age: 74
LOS: 1 days | Discharge: HOME | End: 2024-07-03

## 2024-07-03 DIAGNOSIS — Z98.890 OTHER SPECIFIED POSTPROCEDURAL STATES: Chronic | ICD-10-CM

## 2024-07-03 DIAGNOSIS — D72.819 DECREASED WHITE BLOOD CELL COUNT, UNSPECIFIED: ICD-10-CM

## 2024-07-03 DIAGNOSIS — C50.919 MALIGNANT NEOPLASM OF UNSPECIFIED SITE OF UNSPECIFIED FEMALE BREAST: ICD-10-CM

## 2024-07-07 ENCOUNTER — NON-APPOINTMENT (OUTPATIENT)
Age: 74
End: 2024-07-07

## 2024-07-08 ENCOUNTER — RESULT REVIEW (OUTPATIENT)
Age: 74
End: 2024-07-08

## 2024-07-08 ENCOUNTER — APPOINTMENT (OUTPATIENT)
Dept: HEMATOLOGY ONCOLOGY | Facility: CLINIC | Age: 74
End: 2024-07-08
Payer: MEDICARE

## 2024-07-08 VITALS
RESPIRATION RATE: 14 BRPM | OXYGEN SATURATION: 96 % | WEIGHT: 119.05 LBS | SYSTOLIC BLOOD PRESSURE: 119 MMHG | TEMPERATURE: 97.8 F | HEART RATE: 66 BPM | DIASTOLIC BLOOD PRESSURE: 76 MMHG | BODY MASS INDEX: 19.81 KG/M2

## 2024-07-08 DIAGNOSIS — Z79.811 LONG TERM (CURRENT) USE OF AROMATASE INHIBITORS: ICD-10-CM

## 2024-07-08 DIAGNOSIS — T45.1X5A FLUSHING: ICD-10-CM

## 2024-07-08 DIAGNOSIS — T45.1X5A PAIN IN UNSPECIFIED JOINT: ICD-10-CM

## 2024-07-08 DIAGNOSIS — M25.50 PAIN IN UNSPECIFIED JOINT: ICD-10-CM

## 2024-07-08 DIAGNOSIS — C50.919 MALIGNANT NEOPLASM OF UNSPECIFIED SITE OF UNSPECIFIED FEMALE BREAST: ICD-10-CM

## 2024-07-08 DIAGNOSIS — L65.9 NONSCARRING HAIR LOSS, UNSPECIFIED: ICD-10-CM

## 2024-07-08 DIAGNOSIS — R23.2 FLUSHING: ICD-10-CM

## 2024-07-08 DIAGNOSIS — M85.80 OTHER SPECIFIED DISORDERS OF BONE DENSITY AND STRUCTURE, UNSPECIFIED SITE: ICD-10-CM

## 2024-07-08 LAB
BASOPHILS # BLD AUTO: 0.05 K/UL — SIGNIFICANT CHANGE UP (ref 0–0.2)
BASOPHILS NFR BLD AUTO: 1.2 % — SIGNIFICANT CHANGE UP (ref 0–2)
EOSINOPHIL # BLD AUTO: 0.04 K/UL — SIGNIFICANT CHANGE UP (ref 0–0.5)
EOSINOPHIL NFR BLD AUTO: 1 % — SIGNIFICANT CHANGE UP (ref 0–6)
HCT VFR BLD CALC: 41.9 % — SIGNIFICANT CHANGE UP (ref 34.5–45)
HGB BLD-MCNC: 14.1 G/DL — SIGNIFICANT CHANGE UP (ref 11.5–15.5)
IMM GRANULOCYTES NFR BLD AUTO: 0.2 % — SIGNIFICANT CHANGE UP (ref 0–0.9)
LYMPHOCYTES # BLD AUTO: 1.35 K/UL — SIGNIFICANT CHANGE UP (ref 1–3.3)
LYMPHOCYTES # BLD AUTO: 33.3 % — SIGNIFICANT CHANGE UP (ref 13–44)
MCHC RBC-ENTMCNC: 30.4 PG — SIGNIFICANT CHANGE UP (ref 27–34)
MCHC RBC-ENTMCNC: 33.7 G/DL — SIGNIFICANT CHANGE UP (ref 32–36)
MCV RBC AUTO: 90.3 FL — SIGNIFICANT CHANGE UP (ref 80–100)
MONOCYTES # BLD AUTO: 0.31 K/UL — SIGNIFICANT CHANGE UP (ref 0–0.9)
MONOCYTES NFR BLD AUTO: 7.7 % — SIGNIFICANT CHANGE UP (ref 2–14)
NEUTROPHILS # BLD AUTO: 2.29 K/UL — SIGNIFICANT CHANGE UP (ref 1.8–7.4)
NEUTROPHILS NFR BLD AUTO: 56.6 % — SIGNIFICANT CHANGE UP (ref 43–77)
NRBC # BLD: 0 /100 WBCS — SIGNIFICANT CHANGE UP (ref 0–0)
PLATELET # BLD AUTO: 188 K/UL — SIGNIFICANT CHANGE UP (ref 150–400)
RBC # BLD: 4.64 M/UL — SIGNIFICANT CHANGE UP (ref 3.8–5.2)
RBC # FLD: 12.5 % — SIGNIFICANT CHANGE UP (ref 10.3–14.5)
WBC # BLD: 4.05 K/UL — SIGNIFICANT CHANGE UP (ref 3.8–10.5)
WBC # FLD AUTO: 4.05 K/UL — SIGNIFICANT CHANGE UP (ref 3.8–10.5)

## 2024-07-08 PROCEDURE — G2211 COMPLEX E/M VISIT ADD ON: CPT

## 2024-07-08 PROCEDURE — 99214 OFFICE O/P EST MOD 30 MIN: CPT

## 2024-07-09 LAB
ALBUMIN SERPL ELPH-MCNC: 4.5 G/DL
ALP BLD-CCNC: 66 U/L
ALT SERPL-CCNC: 17 U/L
ANION GAP SERPL CALC-SCNC: 13 MMOL/L
AST SERPL-CCNC: 22 U/L
BILIRUB SERPL-MCNC: 0.4 MG/DL
BUN SERPL-MCNC: 14 MG/DL
CALCIUM SERPL-MCNC: 9.6 MG/DL
CHLORIDE SERPL-SCNC: 97 MMOL/L
CHOLEST SERPL-MCNC: 248 MG/DL
CO2 SERPL-SCNC: 26 MMOL/L
CREAT SERPL-MCNC: 0.61 MG/DL
EGFR: 95 ML/MIN/1.73M2
ESTIMATED AVERAGE GLUCOSE: 108 MG/DL
GLUCOSE SERPL-MCNC: 90 MG/DL
HDLC SERPL-MCNC: 99 MG/DL
LDLC SERPL CALC-MCNC: 140 MG/DL
NONHDLC SERPL-MCNC: 149 MG/DL
POTASSIUM SERPL-SCNC: 4.6 MMOL/L
PROT SERPL-MCNC: 6.2 G/DL
SODIUM SERPL-SCNC: 136 MMOL/L
TRIGL SERPL-MCNC: 57 MG/DL
TSH SERPL-ACNC: 2.36 UIU/ML

## 2024-09-04 ENCOUNTER — APPOINTMENT (OUTPATIENT)
Dept: SURGERY | Facility: CLINIC | Age: 74
End: 2024-09-04
Payer: MEDICARE

## 2024-09-04 PROCEDURE — 99213K: CUSTOM

## 2024-09-24 ENCOUNTER — RESULT REVIEW (OUTPATIENT)
Age: 74
End: 2024-09-24

## 2024-09-24 ENCOUNTER — APPOINTMENT (OUTPATIENT)
Dept: RADIOLOGY | Facility: IMAGING CENTER | Age: 74
End: 2024-09-24

## 2024-09-24 ENCOUNTER — OUTPATIENT (OUTPATIENT)
Dept: OUTPATIENT SERVICES | Facility: HOSPITAL | Age: 74
LOS: 1 days | End: 2024-09-24
Payer: MEDICARE

## 2024-09-24 DIAGNOSIS — Z98.890 OTHER SPECIFIED POSTPROCEDURAL STATES: Chronic | ICD-10-CM

## 2024-09-24 DIAGNOSIS — C50.919 MALIGNANT NEOPLASM OF UNSPECIFIED SITE OF UNSPECIFIED FEMALE BREAST: ICD-10-CM

## 2024-09-24 PROCEDURE — 77085 DXA BONE DENSITY AXL VRT FX: CPT

## 2024-09-24 PROCEDURE — 77085 DXA BONE DENSITY AXL VRT FX: CPT | Mod: 26

## 2025-01-03 ENCOUNTER — OUTPATIENT (OUTPATIENT)
Dept: OUTPATIENT SERVICES | Facility: HOSPITAL | Age: 75
LOS: 1 days | Discharge: HOME | End: 2025-01-03

## 2025-01-03 DIAGNOSIS — Z98.890 OTHER SPECIFIED POSTPROCEDURAL STATES: Chronic | ICD-10-CM

## 2025-01-03 DIAGNOSIS — C50.919 MALIGNANT NEOPLASM OF UNSPECIFIED SITE OF UNSPECIFIED FEMALE BREAST: ICD-10-CM

## 2025-01-08 ENCOUNTER — NON-APPOINTMENT (OUTPATIENT)
Age: 75
End: 2025-01-08

## 2025-01-08 ENCOUNTER — APPOINTMENT (OUTPATIENT)
Dept: HEMATOLOGY ONCOLOGY | Facility: CLINIC | Age: 75
End: 2025-01-08
Payer: MEDICARE

## 2025-01-08 VITALS
OXYGEN SATURATION: 99 % | DIASTOLIC BLOOD PRESSURE: 80 MMHG | WEIGHT: 120 LBS | RESPIRATION RATE: 16 BRPM | BODY MASS INDEX: 19.97 KG/M2 | TEMPERATURE: 96.8 F | HEART RATE: 64 BPM | SYSTOLIC BLOOD PRESSURE: 132 MMHG

## 2025-01-08 DIAGNOSIS — C50.919 MALIGNANT NEOPLASM OF UNSPECIFIED SITE OF UNSPECIFIED FEMALE BREAST: ICD-10-CM

## 2025-01-08 DIAGNOSIS — M85.80 OTHER SPECIFIED DISORDERS OF BONE DENSITY AND STRUCTURE, UNSPECIFIED SITE: ICD-10-CM

## 2025-01-08 DIAGNOSIS — Z79.811 LONG TERM (CURRENT) USE OF AROMATASE INHIBITORS: ICD-10-CM

## 2025-01-08 PROCEDURE — G2211 COMPLEX E/M VISIT ADD ON: CPT

## 2025-01-08 PROCEDURE — 99214 OFFICE O/P EST MOD 30 MIN: CPT

## 2025-03-17 ENCOUNTER — APPOINTMENT (OUTPATIENT)
Dept: OBGYN | Facility: CLINIC | Age: 75
End: 2025-03-17
Payer: MEDICARE

## 2025-03-17 ENCOUNTER — ASOB RESULT (OUTPATIENT)
Age: 75
End: 2025-03-17

## 2025-03-17 VITALS
DIASTOLIC BLOOD PRESSURE: 72 MMHG | HEIGHT: 65 IN | WEIGHT: 120 LBS | SYSTOLIC BLOOD PRESSURE: 129 MMHG | BODY MASS INDEX: 19.99 KG/M2

## 2025-03-17 DIAGNOSIS — C50.919 MALIGNANT NEOPLASM OF UNSPECIFIED SITE OF UNSPECIFIED FEMALE BREAST: ICD-10-CM

## 2025-03-17 DIAGNOSIS — M85.80 OTHER SPECIFIED DISORDERS OF BONE DENSITY AND STRUCTURE, UNSPECIFIED SITE: ICD-10-CM

## 2025-03-17 DIAGNOSIS — Z01.411 ENCOUNTER FOR GYNECOLOGICAL EXAMINATION (GENERAL) (ROUTINE) WITH ABNORMAL FINDINGS: ICD-10-CM

## 2025-03-17 DIAGNOSIS — N90.5 ATROPHY OF VULVA: ICD-10-CM

## 2025-03-17 PROCEDURE — G0101: CPT

## 2025-03-17 PROCEDURE — 76830 TRANSVAGINAL US NON-OB: CPT

## 2025-03-17 PROCEDURE — G0328 FECAL BLOOD SCRN IMMUNOASSAY: CPT | Mod: QW

## 2025-06-26 ENCOUNTER — OUTPATIENT (OUTPATIENT)
Dept: OUTPATIENT SERVICES | Facility: HOSPITAL | Age: 75
LOS: 1 days | End: 2025-06-26
Payer: MEDICARE

## 2025-06-26 ENCOUNTER — APPOINTMENT (OUTPATIENT)
Dept: MRI IMAGING | Facility: CLINIC | Age: 75
End: 2025-06-26
Payer: MEDICARE

## 2025-06-26 DIAGNOSIS — Z00.8 ENCOUNTER FOR OTHER GENERAL EXAMINATION: ICD-10-CM

## 2025-06-26 DIAGNOSIS — Z98.890 OTHER SPECIFIED POSTPROCEDURAL STATES: Chronic | ICD-10-CM

## 2025-06-26 PROCEDURE — 77049 MRI BREAST C-+ W/CAD BI: CPT | Mod: 26

## 2025-06-26 PROCEDURE — A9585: CPT

## 2025-06-26 PROCEDURE — C8937: CPT

## 2025-06-26 PROCEDURE — C8908: CPT

## 2025-09-17 ENCOUNTER — APPOINTMENT (OUTPATIENT)
Dept: SURGERY | Facility: CLINIC | Age: 75
End: 2025-09-17
Payer: MEDICARE

## 2025-09-17 PROCEDURE — 99213K: CUSTOM
